# Patient Record
Sex: MALE | Race: WHITE | NOT HISPANIC OR LATINO | Employment: FULL TIME | URBAN - METROPOLITAN AREA
[De-identification: names, ages, dates, MRNs, and addresses within clinical notes are randomized per-mention and may not be internally consistent; named-entity substitution may affect disease eponyms.]

---

## 2017-02-17 ENCOUNTER — OFFICE VISIT - RIVER FALLS (OUTPATIENT)
Dept: FAMILY MEDICINE | Facility: CLINIC | Age: 62
End: 2017-02-17

## 2017-02-17 ASSESSMENT — MIFFLIN-ST. JEOR: SCORE: 2017.66

## 2017-06-07 ENCOUNTER — OFFICE VISIT - RIVER FALLS (OUTPATIENT)
Dept: FAMILY MEDICINE | Facility: CLINIC | Age: 62
End: 2017-06-07

## 2017-06-07 ASSESSMENT — MIFFLIN-ST. JEOR: SCORE: 2004.05

## 2017-06-15 ENCOUNTER — OFFICE VISIT - RIVER FALLS (OUTPATIENT)
Dept: FAMILY MEDICINE | Facility: CLINIC | Age: 62
End: 2017-06-15

## 2017-10-18 ENCOUNTER — OFFICE VISIT - RIVER FALLS (OUTPATIENT)
Dept: FAMILY MEDICINE | Facility: CLINIC | Age: 62
End: 2017-10-18

## 2017-10-18 ASSESSMENT — MIFFLIN-ST. JEOR: SCORE: 2033.99

## 2017-10-19 LAB
CREAT SERPL-MCNC: 0.94 MG/DL (ref 0.7–1.25)
GLUCOSE BLD-MCNC: 97 MG/DL (ref 65–99)

## 2018-10-08 ENCOUNTER — OFFICE VISIT - RIVER FALLS (OUTPATIENT)
Dept: FAMILY MEDICINE | Facility: CLINIC | Age: 63
End: 2018-10-08

## 2018-10-08 ENCOUNTER — AMBULATORY - RIVER FALLS (OUTPATIENT)
Dept: FAMILY MEDICINE | Facility: CLINIC | Age: 63
End: 2018-10-08

## 2018-10-08 ASSESSMENT — MIFFLIN-ST. JEOR: SCORE: 2054.85

## 2018-10-09 LAB
CREAT SERPL-MCNC: 0.96 MG/DL (ref 0.7–1.25)
GLUCOSE BLD-MCNC: 103 MG/DL (ref 65–99)

## 2019-08-05 ENCOUNTER — OFFICE VISIT - RIVER FALLS (OUTPATIENT)
Dept: FAMILY MEDICINE | Facility: CLINIC | Age: 64
End: 2019-08-05

## 2019-08-05 ASSESSMENT — MIFFLIN-ST. JEOR: SCORE: 2033.08

## 2019-09-10 ENCOUNTER — OFFICE VISIT - RIVER FALLS (OUTPATIENT)
Dept: FAMILY MEDICINE | Facility: CLINIC | Age: 64
End: 2019-09-10

## 2019-10-07 ENCOUNTER — OFFICE VISIT - RIVER FALLS (OUTPATIENT)
Dept: FAMILY MEDICINE | Facility: CLINIC | Age: 64
End: 2019-10-07

## 2019-10-07 LAB
ALBUMIN UR-MCNC: NEGATIVE G/DL
BILIRUB UR QL STRIP: NEGATIVE
GLUCOSE UR STRIP-MCNC: NEGATIVE MG/DL
HGB UR QL STRIP: NEGATIVE
KETONES UR STRIP-MCNC: NEGATIVE MG/DL
LEUKOCYTE ESTERASE UR QL STRIP: NEGATIVE
NITRATE UR QL: NEGATIVE
PH UR STRIP: 6 [PH] (ref 5–8)
SP GR UR STRIP: 1.01 (ref 1–1.03)

## 2019-10-07 ASSESSMENT — MIFFLIN-ST. JEOR: SCORE: 2026.73

## 2019-11-04 ENCOUNTER — COMMUNICATION - RIVER FALLS (OUTPATIENT)
Dept: FAMILY MEDICINE | Facility: CLINIC | Age: 64
End: 2019-11-04

## 2019-11-18 ENCOUNTER — OFFICE VISIT - RIVER FALLS (OUTPATIENT)
Dept: FAMILY MEDICINE | Facility: CLINIC | Age: 64
End: 2019-11-18

## 2019-11-18 ASSESSMENT — MIFFLIN-ST. JEOR: SCORE: 2013.12

## 2019-11-19 LAB
BUN SERPL-MCNC: 19 MG/DL (ref 7–25)
BUN/CREAT RATIO - HISTORICAL: ABNORMAL (ref 6–22)
CALCIUM SERPL-MCNC: 10 MG/DL (ref 8.6–10.3)
CHLORIDE BLD-SCNC: 102 MMOL/L (ref 98–110)
CHOLEST SERPL-MCNC: 205 MG/DL
CHOLEST/HDLC SERPL: 5.4 {RATIO}
CO2 SERPL-SCNC: 28 MMOL/L (ref 20–32)
CREAT SERPL-MCNC: 1.08 MG/DL (ref 0.7–1.25)
EGFRCR SERPLBLD CKD-EPI 2021: 73 ML/MIN/1.73M2
GLUCOSE BLD-MCNC: 101 MG/DL (ref 65–99)
HBA1C MFR BLD: 5.7 %
HDLC SERPL-MCNC: 38 MG/DL
LDLC SERPL CALC-MCNC: 138 MG/DL
NONHDLC SERPL-MCNC: 167 MG/DL
POTASSIUM BLD-SCNC: 4.5 MMOL/L (ref 3.5–5.3)
SODIUM SERPL-SCNC: 139 MMOL/L (ref 135–146)
TRIGL SERPL-MCNC: 156 MG/DL

## 2019-11-20 ENCOUNTER — COMMUNICATION - RIVER FALLS (OUTPATIENT)
Dept: FAMILY MEDICINE | Facility: CLINIC | Age: 64
End: 2019-11-20

## 2020-08-11 ENCOUNTER — OFFICE VISIT - RIVER FALLS (OUTPATIENT)
Dept: FAMILY MEDICINE | Facility: CLINIC | Age: 65
End: 2020-08-11

## 2020-08-12 ENCOUNTER — AMBULATORY - RIVER FALLS (OUTPATIENT)
Dept: FAMILY MEDICINE | Facility: CLINIC | Age: 65
End: 2020-08-12

## 2020-08-13 ENCOUNTER — COMMUNICATION - RIVER FALLS (OUTPATIENT)
Dept: FAMILY MEDICINE | Facility: CLINIC | Age: 65
End: 2020-08-13

## 2020-08-13 LAB
BASOPHILS # BLD MANUAL: 62 10*3/UL (ref 0–200)
BASOPHILS NFR BLD MANUAL: 0.8 %
BUN SERPL-MCNC: 19 MG/DL (ref 7–25)
BUN/CREAT RATIO - HISTORICAL: ABNORMAL (ref 6–22)
CALCIUM SERPL-MCNC: 9.4 MG/DL (ref 8.6–10.3)
CHLORIDE BLD-SCNC: 107 MMOL/L (ref 98–110)
CO2 SERPL-SCNC: 27 MMOL/L (ref 20–32)
CREAT SERPL-MCNC: 1.1 MG/DL (ref 0.7–1.25)
EGFRCR SERPLBLD CKD-EPI 2021: 71 ML/MIN/1.73M2
EOSINOPHIL # BLD MANUAL: 320 10*3/UL (ref 15–500)
EOSINOPHIL NFR BLD MANUAL: 4.1 %
ERYTHROCYTE [DISTWIDTH] IN BLOOD BY AUTOMATED COUNT: 15.6 % (ref 11–15)
GLUCOSE BLD-MCNC: 110 MG/DL (ref 65–99)
HCT VFR BLD AUTO: 39.5 % (ref 38.5–50)
HGB BLD-MCNC: 12.8 GM/DL (ref 13.2–17.1)
LYMPHOCYTES # BLD MANUAL: 2395 10*3/UL (ref 850–3900)
LYMPHOCYTES NFR BLD MANUAL: 30.7 %
MCH RBC QN AUTO: 25.8 PG (ref 27–33)
MCHC RBC AUTO-ENTMCNC: 32.4 GM/DL (ref 32–36)
MCV RBC AUTO: 79.6 FL (ref 80–100)
MONOCYTES # BLD MANUAL: 788 10*3/UL (ref 200–950)
MONOCYTES NFR BLD MANUAL: 10.1 %
NEUTROPHILS # BLD MANUAL: 4235 10*3/UL (ref 1500–7800)
NEUTROPHILS NFR BLD MANUAL: 54.3 %
PLATELET # BLD AUTO: 325 10*3/UL (ref 140–400)
PMV BLD: 9.2 FL (ref 7.5–12.5)
POTASSIUM BLD-SCNC: 4.7 MMOL/L (ref 3.5–5.3)
RBC # BLD AUTO: 4.96 10*6/UL (ref 4.2–5.8)
SODIUM SERPL-SCNC: 138 MMOL/L (ref 135–146)
WBC # BLD AUTO: 7.8 10*3/UL (ref 3.8–10.8)

## 2020-09-30 ENCOUNTER — AMBULATORY - RIVER FALLS (OUTPATIENT)
Dept: FAMILY MEDICINE | Facility: CLINIC | Age: 65
End: 2020-09-30

## 2020-09-30 ENCOUNTER — OFFICE VISIT - RIVER FALLS (OUTPATIENT)
Dept: FAMILY MEDICINE | Facility: CLINIC | Age: 65
End: 2020-09-30

## 2020-09-30 LAB
ALBUMIN UR-MCNC: NEGATIVE G/DL
BILIRUB UR QL STRIP: NEGATIVE
GLUCOSE UR STRIP-MCNC: NEGATIVE MG/DL
HGB UR QL STRIP: NEGATIVE
KETONES UR STRIP-MCNC: NEGATIVE MG/DL
LEUKOCYTE ESTERASE UR QL STRIP: NEGATIVE
NITRATE UR QL: NEGATIVE
PH UR STRIP: 7 [PH] (ref 5–8)
SP GR UR STRIP: 1.02 (ref 1–1.03)

## 2020-09-30 ASSESSMENT — MIFFLIN-ST. JEOR: SCORE: 2025.82

## 2020-10-01 LAB
CHOLEST SERPL-MCNC: 188 MG/DL
CHOLEST/HDLC SERPL: 4.9 {RATIO}
HDLC SERPL-MCNC: 38 MG/DL
LDLC SERPL CALC-MCNC: 128 MG/DL
NONHDLC SERPL-MCNC: 150 MG/DL
TRIGL SERPL-MCNC: 116 MG/DL

## 2020-10-02 ENCOUNTER — COMMUNICATION - RIVER FALLS (OUTPATIENT)
Dept: FAMILY MEDICINE | Facility: CLINIC | Age: 65
End: 2020-10-02

## 2020-12-01 ENCOUNTER — COMMUNICATION - RIVER FALLS (OUTPATIENT)
Dept: FAMILY MEDICINE | Facility: CLINIC | Age: 65
End: 2020-12-01

## 2020-12-14 ENCOUNTER — OFFICE VISIT - RIVER FALLS (OUTPATIENT)
Dept: FAMILY MEDICINE | Facility: CLINIC | Age: 65
End: 2020-12-14

## 2020-12-14 ASSESSMENT — MIFFLIN-ST. JEOR: SCORE: 2031.27

## 2020-12-15 ENCOUNTER — COMMUNICATION - RIVER FALLS (OUTPATIENT)
Dept: FAMILY MEDICINE | Facility: CLINIC | Age: 65
End: 2020-12-15

## 2020-12-15 LAB
ERYTHROCYTE [DISTWIDTH] IN BLOOD BY AUTOMATED COUNT: 14.6 % (ref 11–15)
HCT VFR BLD AUTO: 40.4 % (ref 38.5–50)
HGB BLD-MCNC: 13.6 GM/DL (ref 13.2–17.1)
MCH RBC QN AUTO: 28.1 PG (ref 27–33)
MCHC RBC AUTO-ENTMCNC: 33.7 GM/DL (ref 32–36)
MCV RBC AUTO: 83.5 FL (ref 80–100)
PLATELET # BLD AUTO: 304 10*3/UL (ref 140–400)
PMV BLD: 9.2 FL (ref 7.5–12.5)
RBC # BLD AUTO: 4.84 10*6/UL (ref 4.2–5.8)
WBC # BLD AUTO: 6.7 10*3/UL (ref 3.8–10.8)

## 2021-03-04 ENCOUNTER — AMBULATORY - HEALTHEAST (OUTPATIENT)
Dept: CARDIOLOGY | Facility: TELEHEALTH | Age: 66
End: 2021-03-04

## 2021-03-04 ENCOUNTER — OFFICE VISIT - RIVER FALLS (OUTPATIENT)
Dept: FAMILY MEDICINE | Facility: CLINIC | Age: 66
End: 2021-03-04

## 2021-03-04 DIAGNOSIS — R07.89 CHEST PAIN, ATYPICAL: ICD-10-CM

## 2021-03-04 ASSESSMENT — MIFFLIN-ST. JEOR: SCORE: 2058.48

## 2021-03-05 ENCOUNTER — RECORDS - HEALTHEAST (OUTPATIENT)
Dept: ADMINISTRATIVE | Facility: OTHER | Age: 66
End: 2021-03-05

## 2021-03-10 ENCOUNTER — HOSPITAL ENCOUNTER (OUTPATIENT)
Dept: CARDIOLOGY | Facility: CLINIC | Age: 66
Discharge: HOME | End: 2021-03-10

## 2021-03-10 DIAGNOSIS — R07.9 CHEST PAIN: ICD-10-CM

## 2021-03-10 LAB
CV STRESS CURRENT BP HE: NORMAL
CV STRESS CURRENT HR HE: 102
CV STRESS CURRENT HR HE: 103
CV STRESS CURRENT HR HE: 105
CV STRESS CURRENT HR HE: 107
CV STRESS CURRENT HR HE: 107
CV STRESS CURRENT HR HE: 110
CV STRESS CURRENT HR HE: 114
CV STRESS CURRENT HR HE: 117
CV STRESS CURRENT HR HE: 118
CV STRESS CURRENT HR HE: 119
CV STRESS CURRENT HR HE: 124
CV STRESS CURRENT HR HE: 128
CV STRESS CURRENT HR HE: 133
CV STRESS CURRENT HR HE: 134
CV STRESS CURRENT HR HE: 61
CV STRESS CURRENT HR HE: 66
CV STRESS CURRENT HR HE: 78
CV STRESS CURRENT HR HE: 80
CV STRESS CURRENT HR HE: 81
CV STRESS CURRENT HR HE: 82
CV STRESS CURRENT HR HE: 83
CV STRESS CURRENT HR HE: 84
CV STRESS CURRENT HR HE: 85
CV STRESS CURRENT HR HE: 85
CV STRESS CURRENT HR HE: 88
CV STRESS CURRENT HR HE: 97
CV STRESS DEVIATION TIME HE: NORMAL
CV STRESS ECHO PERCENT HR HE: NORMAL
CV STRESS EXERCISE STAGE HE: NORMAL
CV STRESS FINAL RESTING BP HE: NORMAL
CV STRESS FINAL RESTING HR HE: 82
CV STRESS MAX HR HE: 137
CV STRESS MAX TREADMILL GRADE HE: 16
CV STRESS MAX TREADMILL SPEED HE: 4.2
CV STRESS PEAK DIA BP HE: NORMAL
CV STRESS PEAK SYS BP HE: NORMAL
CV STRESS PHASE HE: NORMAL
CV STRESS PROTOCOL HE: NORMAL
CV STRESS RESTING PT POSITION HE: NORMAL
CV STRESS ST DEVIATION AMOUNT HE: NORMAL
CV STRESS ST DEVIATION ELEVATION HE: NORMAL
CV STRESS ST EVELATION AMOUNT HE: NORMAL
CV STRESS TEST TYPE HE: NORMAL
CV STRESS TOTAL STAGE TIME MIN 1 HE: NORMAL
ECHO EJECTION FRACTION ESTIMATED: 55 %
RATE PRESSURE PRODUCT: NORMAL
STRESS ECHO BASELINE DIASTOLIC HE: 86
STRESS ECHO BASELINE HR: 65
STRESS ECHO BASELINE SYSTOLIC BP: 154
STRESS ECHO CALCULATED PERCENT HR: 88 %
STRESS ECHO LAST STRESS DIASTOLIC BP: 78
STRESS ECHO LAST STRESS HR: 133
STRESS ECHO LAST STRESS SYSTOLIC BP: 186
STRESS ECHO POST ESTIMATED WORKLOAD: 10.3
STRESS ECHO POST EXERCISE DUR MIN: 9
STRESS ECHO POST EXERCISE DUR SEC: 0
STRESS ECHO TARGET HR: 156

## 2021-03-10 RX ORDER — MULTIVITAMIN WITH IRON
1 TABLET,CHEWABLE ORAL DAILY
Status: SHIPPED | COMMUNITY
Start: 2021-03-10 | End: 2022-04-14

## 2021-03-10 RX ORDER — SILDENAFIL 100 MG/1
100 TABLET, FILM COATED ORAL DAILY PRN
Status: SHIPPED | COMMUNITY
Start: 2021-03-10

## 2021-03-16 ENCOUNTER — AMBULATORY - HEALTHEAST (OUTPATIENT)
Dept: CARDIOLOGY | Facility: HOSPITAL | Age: 66
End: 2021-03-16

## 2021-03-16 ENCOUNTER — SURGERY - HEALTHEAST (OUTPATIENT)
Dept: CARDIOLOGY | Facility: CLINIC | Age: 66
End: 2021-03-16

## 2021-03-16 ENCOUNTER — AMBULATORY - HEALTHEAST (OUTPATIENT)
Dept: CARDIOLOGY | Facility: CLINIC | Age: 66
End: 2021-03-16

## 2021-03-16 ENCOUNTER — RECORDS - HEALTHEAST (OUTPATIENT)
Dept: ADMINISTRATIVE | Facility: OTHER | Age: 66
End: 2021-03-16

## 2021-03-16 ENCOUNTER — OFFICE VISIT - HEALTHEAST (OUTPATIENT)
Dept: CARDIOLOGY | Facility: CLINIC | Age: 66
End: 2021-03-16

## 2021-03-16 DIAGNOSIS — E78.5 DYSLIPIDEMIA: ICD-10-CM

## 2021-03-16 DIAGNOSIS — Z11.59 ENCOUNTER FOR SCREENING FOR OTHER VIRAL DISEASES: ICD-10-CM

## 2021-03-16 DIAGNOSIS — R07.2 PRECORDIAL PAIN: ICD-10-CM

## 2021-03-16 DIAGNOSIS — I10 ESSENTIAL HYPERTENSION: ICD-10-CM

## 2021-03-16 DIAGNOSIS — I25.10 CORONARY ARTERY DISEASE INVOLVING NATIVE CORONARY ARTERY OF NATIVE HEART WITHOUT ANGINA PECTORIS: ICD-10-CM

## 2021-03-16 RX ORDER — METOPROLOL SUCCINATE 25 MG/1
25 TABLET, EXTENDED RELEASE ORAL DAILY
Qty: 90 TABLET | Refills: 3 | Status: SHIPPED | OUTPATIENT
Start: 2021-03-16 | End: 2021-09-29 | Stop reason: ALTCHOICE

## 2021-03-16 RX ORDER — NITROGLYCERIN 0.4 MG/1
0.4 TABLET SUBLINGUAL EVERY 5 MIN PRN
Qty: 30 TABLET | Status: SHIPPED | OUTPATIENT
Start: 2021-03-16 | End: 2022-06-20

## 2021-03-16 ASSESSMENT — MIFFLIN-ST. JEOR: SCORE: 2043.64

## 2021-03-19 ENCOUNTER — AMBULATORY - HEALTHEAST (OUTPATIENT)
Dept: LAB | Facility: CLINIC | Age: 66
End: 2021-03-19

## 2021-03-19 DIAGNOSIS — Z11.59 ENCOUNTER FOR SCREENING FOR OTHER VIRAL DISEASES: ICD-10-CM

## 2021-03-20 LAB
SARS-COV-2 PCR COMMENT: NORMAL
SARS-COV-2 RNA SPEC QL NAA+PROBE: NEGATIVE
SARS-COV-2 VIRUS SPECIMEN SOURCE: NORMAL

## 2021-03-21 ENCOUNTER — COMMUNICATION - HEALTHEAST (OUTPATIENT)
Dept: SCHEDULING | Facility: CLINIC | Age: 66
End: 2021-03-21

## 2021-03-23 ENCOUNTER — AMBULATORY - HEALTHEAST (OUTPATIENT)
Dept: CARDIOLOGY | Facility: CLINIC | Age: 66
End: 2021-03-23

## 2021-03-23 ENCOUNTER — SURGERY - HEALTHEAST (OUTPATIENT)
Dept: CARDIOLOGY | Facility: HOSPITAL | Age: 66
End: 2021-03-23

## 2021-03-23 DIAGNOSIS — I20.0 UNSTABLE ANGINA (H): ICD-10-CM

## 2021-03-23 ASSESSMENT — MIFFLIN-ST. JEOR
SCORE: 2043.64
SCORE: 1998.28
SCORE: 2043.64

## 2021-03-24 ENCOUNTER — ANESTHESIA - HEALTHEAST (OUTPATIENT)
Dept: SURGERY | Facility: HOSPITAL | Age: 66
End: 2021-03-24

## 2021-03-24 ENCOUNTER — COMMUNICATION - HEALTHEAST (OUTPATIENT)
Dept: CARDIOLOGY | Facility: CLINIC | Age: 66
End: 2021-03-24

## 2021-03-24 ENCOUNTER — SURGERY - HEALTHEAST (OUTPATIENT)
Dept: ADMINISTRATIVE | Facility: CLINIC | Age: 66
End: 2021-03-24

## 2021-03-24 DIAGNOSIS — I25.10 CAD (CORONARY ARTERY DISEASE): ICD-10-CM

## 2021-03-24 DIAGNOSIS — I20.0 UNSTABLE ANGINA (H): ICD-10-CM

## 2021-03-24 ASSESSMENT — MIFFLIN-ST. JEOR
SCORE: 2035.02
SCORE: 2035.02

## 2021-03-25 ENCOUNTER — SURGERY - HEALTHEAST (OUTPATIENT)
Dept: SURGERY | Facility: HOSPITAL | Age: 66
End: 2021-03-25

## 2021-03-25 ASSESSMENT — MIFFLIN-ST. JEOR
SCORE: 2023.68
SCORE: 2023.68

## 2021-03-26 ASSESSMENT — MIFFLIN-ST. JEOR
SCORE: 2047.27
SCORE: 2047.27

## 2021-03-27 ASSESSMENT — MIFFLIN-ST. JEOR
SCORE: 2047.27
SCORE: 2047.27

## 2021-03-28 ASSESSMENT — MIFFLIN-ST. JEOR
SCORE: 2061.33
SCORE: 2061.33

## 2021-03-29 ASSESSMENT — MIFFLIN-ST. JEOR
SCORE: 2022.78
SCORE: 2022.78

## 2021-04-02 ENCOUNTER — COMMUNICATION - HEALTHEAST (OUTPATIENT)
Dept: CARDIOLOGY | Facility: CLINIC | Age: 66
End: 2021-04-02

## 2021-04-05 ENCOUNTER — OFFICE VISIT - RIVER FALLS (OUTPATIENT)
Dept: FAMILY MEDICINE | Facility: CLINIC | Age: 66
End: 2021-04-05

## 2021-04-05 ASSESSMENT — MIFFLIN-ST. JEOR: SCORE: 2017.66

## 2021-04-27 ENCOUNTER — OFFICE VISIT - HEALTHEAST (OUTPATIENT)
Dept: CARDIOLOGY | Facility: CLINIC | Age: 66
End: 2021-04-27

## 2021-04-27 DIAGNOSIS — R01.0 BENIGN HEART MURMUR: ICD-10-CM

## 2021-04-27 DIAGNOSIS — Z95.1 S/P CORONARY ARTERY BYPASS GRAFT X 2: ICD-10-CM

## 2021-05-17 ENCOUNTER — OFFICE VISIT - HEALTHEAST (OUTPATIENT)
Dept: CARDIOLOGY | Facility: CLINIC | Age: 66
End: 2021-05-17

## 2021-05-17 DIAGNOSIS — I25.10 CORONARY ARTERY DISEASE INVOLVING NATIVE CORONARY ARTERY OF NATIVE HEART WITHOUT ANGINA PECTORIS: ICD-10-CM

## 2021-05-17 DIAGNOSIS — E78.5 DYSLIPIDEMIA: ICD-10-CM

## 2021-05-17 DIAGNOSIS — R55 PRE-SYNCOPE: ICD-10-CM

## 2021-05-17 DIAGNOSIS — R42 DIZZINESS: ICD-10-CM

## 2021-05-17 ASSESSMENT — MIFFLIN-ST. JEOR: SCORE: 2025.5

## 2021-05-20 ENCOUNTER — HOSPITAL ENCOUNTER (OUTPATIENT)
Dept: CARDIOLOGY | Facility: CLINIC | Age: 66
Discharge: HOME | End: 2021-05-20
Attending: INTERNAL MEDICINE

## 2021-05-20 DIAGNOSIS — R55 PRE-SYNCOPE: ICD-10-CM

## 2021-05-20 DIAGNOSIS — R42 DIZZINESS: ICD-10-CM

## 2021-05-27 VITALS
HEART RATE: 64 BPM | DIASTOLIC BLOOD PRESSURE: 78 MMHG | SYSTOLIC BLOOD PRESSURE: 124 MMHG | BODY MASS INDEX: 38.8 KG/M2 | RESPIRATION RATE: 16 BRPM | WEIGHT: 271 LBS | HEIGHT: 70 IN

## 2021-06-05 VITALS
BODY MASS INDEX: 38.71 KG/M2 | WEIGHT: 270.4 LBS | HEIGHT: 70 IN | WEIGHT: 270.4 LBS | BODY MASS INDEX: 38.71 KG/M2 | HEIGHT: 70 IN

## 2021-06-05 VITALS
HEIGHT: 70 IN | DIASTOLIC BLOOD PRESSURE: 86 MMHG | HEART RATE: 70 BPM | WEIGHT: 275 LBS | OXYGEN SATURATION: 96 % | SYSTOLIC BLOOD PRESSURE: 126 MMHG | BODY MASS INDEX: 39.37 KG/M2 | RESPIRATION RATE: 18 BRPM

## 2021-06-08 ENCOUNTER — COMMUNICATION - HEALTHEAST (OUTPATIENT)
Dept: CARDIOLOGY | Facility: CLINIC | Age: 66
End: 2021-06-08

## 2021-06-08 DIAGNOSIS — E78.5 DYSLIPIDEMIA: ICD-10-CM

## 2021-06-08 RX ORDER — ROSUVASTATIN CALCIUM 5 MG/1
5 TABLET, COATED ORAL AT BEDTIME
Qty: 30 TABLET | Refills: 1 | Status: SHIPPED | OUTPATIENT
Start: 2021-06-08 | End: 2021-08-05

## 2021-06-16 PROBLEM — I20.0 UNSTABLE ANGINA (H): Status: ACTIVE | Noted: 2021-03-23

## 2021-06-16 PROBLEM — I25.10 CORONARY ARTERY DISEASE INVOLVING NATIVE CORONARY ARTERY OF NATIVE HEART WITHOUT ANGINA PECTORIS: Status: ACTIVE | Noted: 2021-03-16

## 2021-06-16 PROBLEM — I25.110 CORONARY ARTERY DISEASE INVOLVING NATIVE CORONARY ARTERY OF NATIVE HEART WITH UNSTABLE ANGINA PECTORIS (H): Status: ACTIVE | Noted: 2021-03-16

## 2021-06-16 PROBLEM — R07.2 PRECORDIAL PAIN: Status: ACTIVE | Noted: 2021-03-16

## 2021-06-16 PROBLEM — E78.5 DYSLIPIDEMIA: Status: ACTIVE | Noted: 2021-03-16

## 2021-06-16 NOTE — ANESTHESIA PROCEDURE NOTES
Central line    Start time: 3/25/2021 2:09 PM  End time: 3/25/2021 2:26 PM  Patient location: OR Post-induction  Indications: central pressure monitoring and vascular access  Performing Anesthesiologist: dAam Falcon MD  Pre-procedure Checklist  Completed: patient identified, site marked, risks, benefits, and alternatives discussed, timeout performed, consent obtained, hand hygiene performed, all elements of maximal sterile barriers used including cap, mask, gown, sterile gloves, and large sheet and skin prep agent completely dried prior to procedure    Procedure Details:  Preparation: 2% chlorhexidine  Location details: right internal jugular  Site selection rationale: CV surgery  Catheter type: Introducer with Langlois-Ashley  Introducer type: MAC  Lumens:double lumenWedged at: 54   Withdrawn and locked at: 49  Ultrasound evaluation of access site: yes   Sterile gel and probe cover used in ultrasound-guided central venous catheter insertion  Vessel patent by US exam  Concurrent real time visualization of needle entry  Visualized anatomic structures normal  Ultrasound permanent image saved  No Pathological Findings  Transduced for venous waveform  Manometry confirmation of venous access    Post-procedure:   line sutured and Antimicrobial disks with CHG applied  Assessment: blood return through all ports and free fluid flow  Complications: none

## 2021-06-16 NOTE — ANESTHESIA POSTPROCEDURE EVALUATION
Patient: Gene Carrero  Procedure(s):  CORONARY ARTERY BYPASS GRAFT X2, WITH LEFT LEG ENDOSCOPIC VESSEL PROCUREMENT, LEFT INTERNAL MAMMARY ARTERY HARVEST, ANESTHESIA TRANSESOPHAGEAL ECHOCARDIOGRAM, EPIAORTIC ULTRASOUND  Anesthesia type: general    Patient location: Telemetry/Step Down Unit  Last vitals:   Vitals Value Taken Time   /66 03/27/21 1124   Temp 36.8  C (98.2  F) 03/27/21 1124   Pulse 68 03/27/21 1358   Resp 18 03/27/21 1124   SpO2 91 % 03/27/21 1358   Vitals shown include unvalidated device data.  Post vital signs: stable  Level of consciousness: awake, alert and oriented  Post-anesthesia pain: pain controlled  Post-anesthesia nausea and vomiting: no  Pulmonary: CPAP  Cardiovascular: stable and blood pressure at baseline  Hydration: adequate  Anesthetic events: no    QCDR Measures:  ASA# 11 - Trista-op Cardiac Arrest: ASA11B - Patient did NOT experience unanticipated cardiac arrest  ASA# 12 - Trista-op Mortality Rate: ASA12B - Patient did NOT die  ASA# 13 - PACU Re-Intubation Rate: ASA13B - Patient did NOT require a new airway mgmt  ASA# 10 - Composite Anes Safety: ASA10A - No serious adverse event    Additional Notes:

## 2021-06-16 NOTE — TELEPHONE ENCOUNTER
4/2  Left VM for patient to call CV RN with update after discharging from hospital.    Contact info provided.    4/5  Received VM from pt returning call, returned call and left another  for pt to call CV RN.    CARDIOTHORACIC SURGERY  Discharge Follow Up Phone Call    POST OP MONITORING  How is your pain on a 0-10 scale, how are you managing your pain? Patient isn't having any pain or taking any tylenol.    ACTIVITY  How is your activity tolerance? Remaining active and tolerating it well.  Are you still doing sternal precautions? Yes.  Do you hear any clicking when you are moving or taking a deep breath? No.    Are you weighing yourself daily? Weight has remained stable.    SIGNS AND SYMPTOMS OF INFECTION  1. INCREASE IN PAIN - No  2. FEVER - No  3. DRAINAGE - No If so, color: NA  4. REDNESS - No  5. SWELLING - No    ASSISTANCE  Do you have someone at home to assist you with your daily activities? Spouse is helping pt.    MEDICATIONS  Is someone helping you to set up your medications? Pt is independent.  Do you have any questions about your medications? No.    Are you on a blood thinner? No.  Who is managing your INRs? NA    FOLLOW UP  Are you scheduled for cardiac rehab? 4/2.    You are scheduled to see our surgery advanced practive provider for post operative follow up on 4/27.  You are scheduled to see your cardiologist on 5/17.  You are scheduled to see your primary care physician on 4/5.    CONTACT INFORMATION  Please feel free to call us with any questions or symptoms that are concerning for you at [phone]. If it is after 4:30 in the afternoon, or a weekend please call [phone] and ask for the on call specialist. We want to do everything we can to help prevent you needing to return to the ED, so please do not hesitate to call us.    Cheri Alvarez, RNCC  Cardiothoracic Surgery  [phone]

## 2021-06-16 NOTE — ANESTHESIA PROCEDURE NOTES
BENTON    Patient location during procedure: OR  Start time: 3/25/2021 2:31 PM  Staffing:  Performing  Anesthesiologist: Adam Falcon MD  BENTON:  Type/Reason: Monitoring BENTON  Technique: blind insertion  Difficulty: easy  Anesthesia Monitoring: see additional note

## 2021-06-16 NOTE — ANESTHESIA PREPROCEDURE EVALUATION
Anesthesia Evaluation      Patient summary reviewed   No history of anesthetic complications     Airway   Mallampati: III  Neck ROM: full   Pulmonary - normal exam   (+) sleep apnea,   (-) pneumonia, asthma, shortness of breath, recent URI                         Cardiovascular   Exercise tolerance: > or = 4 METS  (+) hypertension, CAD, angina, , hypercholesterolemia,     (-) CABG/stent  ECG reviewed  Rhythm: regular        Neuro/Psych - negative ROS   (-) no CVA    Endo/Other    (+) arthritis, obesity,   (-) no diabetes     GI/Hepatic/Renal    (+) GERD,     (-) esophageal disease     Other findings: EF 58%, no WMA, no significant valve dz, mild ascending aortic dilation at 4.1cm      Dental - normal exam                        Anesthesia Plan  Planned anesthetic: general endotracheal  glidescope intubation    Magnesium & precedex gtt for pain, ketamine 50mg, methadone 20mg    ASA 3   Induction: intravenous   Anesthetic plan and risks discussed with: patient and spouse  Anesthesia plan special considerations: increased risk of difficult airway, antiemetics, CVP line, arterial catheterization, pulmonary artery catheterization,   Post-op plan: extended intubation/vent support and other

## 2021-06-16 NOTE — PROGRESS NOTES
Gene JUAN Carrero   [address] [facility] [address]  [phone] (home)     Primary cardiologist:  Dr. Gibbs  PCP:  Jeff Haider MD  Procedure:  Coronary Angiogram With Possible Percutaneous Coronary Intervention  H&P completed by:  Dr. Gibbs  Case MD:  Dr. Brian or Dr. Shanks  Admit date and time:  3/23/21 @ 07:30  Case start time:  TBD  Ordering MD:  Dr. Gibbs  Diagnosis:  Abnormal Stress Echo  Anticoagulation: None  CPAP: Yes -instructed to bring  Bypass Grafts: No  Renal Issues: No  Allergies: NKA  Diabetic?: No  Device?: No     Angiogram Teaching    Reason for Visit:  Patient seen for pre-procedure education in preparation for: Coronary Angiogram With Possible Percutaneous Coronary Intervention    Procedure Prep:  Cardiologist note dated: 3/16/21  EKG results obtained, dated: on admission  Pertinent test results obtained - Viewable in Epic, dated: 3/10/21 Stress Echo  Hemogram results obtained: on admmission  Basic Metabolic Panel results obtained: on admission  Lipid Profile results obtained: on admission    Pre-procedure instructions  Patient instructed to be NPO after midnight.  Patient instructed to arrange for transportation home following procedure.  Patient instructed to have a responsible adult with them for 24 hours post-procedure.  Post-procedure follow up process.  Conscious sedation discussed.  The patient was sent the pre-procedure letter (If requested) on 3/16/21    Pre-procedure medication instructions  Patient instructed on antiplatelet medication.  Continue medications as scheduled, with a small amount of water on the day of the procedure unless indicated.  Patient instructed to take 325 mg of Aspirin am of procedure: Yes  Other medication: instructed to hold multivitamins, minerals, viagra for 5 days prior a.m. of the procedure.    *PATIENTS RECORDS AVAILABLE IN [facility] UNLESS OTHERWISE INDICATED*    *Order set was entered on this date: 3/16/21      Patient Active  Problem List   Diagnosis     Dyslipidemia     Coronary artery disease involving native coronary artery of native heart without angina pectoris     Precordial pain       Current Outpatient Medications   Medication Sig Dispense Refill     aspirin 81 MG EC tablet Take 81 mg by mouth daily.       atorvastatin (LIPITOR) 80 MG tablet Take 1 tablet (80 mg total) by mouth at bedtime. 90 tablet 3     lisinopriL (PRINIVIL,ZESTRIL) 10 MG tablet Take 10 mg by mouth daily.       metoprolol succinate (TOPROL-XL) 25 MG Take 1 tablet (25 mg total) by mouth daily. 90 tablet 3     nitroglycerin (NITROSTAT) 0.4 MG SL tablet Place 1 tablet (0.4 mg total) under the tongue every 5 (five) minutes as needed for chest pain. 30 tablet prn     omeprazole (PRILOSEC) 20 MG capsule Take 20 mg by mouth daily before breakfast.       pediatric multivitamin-iron (POLY-VI-SOL WITH IRON) chewable tablet Chew 1 tablet daily.       sildenafiL (VIAGRA) 100 MG tablet Take 100 mg by mouth daily as needed for erectile dysfunction.       No current facility-administered medications for this visit.        No Known Allergies    Plan  Patient's wife, Layla, will be the  the day of the procedure and will stay with patient for 24 hours after.  Patient ready for procedure    LISA Knight

## 2021-06-16 NOTE — ANESTHESIA CARE TRANSFER NOTE
Last vitals:   Vitals:    03/25/21 1842   BP: 124/78   Pulse: 80   Resp: 16   Temp: 36.1  C (97  F)   SpO2: 99%     Patient's level of consciousness is unresponsive and sedated and intubated  Spontaneous respirations: no: sedated and intubated  Maintains airway independently: no: intubated  Dentition unchanged: yes  Oropharynx: endotracheal tube in place    QCDR Measures:  ASA# 20 - Surgical Safety Checklist: WHO surgical safety checklist completed prior to induction    PQRS# 430 - Adult PONV Prevention: 4558F - Pt received => 2 anti-emetic agents (different classes) preop & intraop  ASA# 8 - Peds PONV Prevention: NA - Not pediatric patient, not GA or 2 or more risk factors NOT present  PQRS# 424 - Trista-op Temp Management: 4559F - At least one body temp DOCUMENTED => 35.5C or 95.9F within required timeframe  PQRS# 426 - PACU Transfer Protocol: - Transfer of care checklist used  ASA# 14 - Acute Post-op Pain: ASA14B - Patient did NOT experience pain >= 7 out of 10

## 2021-06-16 NOTE — TELEPHONE ENCOUNTER
Spoke to wife about pt's surgery tomorrow and addressed all questions/concerns. Explained average duration of stay in the hospital, what surgery is being performed, length of surgery and post op events/goals for patient.    Explained he will need cardiac rehab after surgery and may be able to be discharged directly home.    Contact info provided.

## 2021-06-16 NOTE — ANESTHESIA PROCEDURE NOTES
Arterial Line  Reason for Procedure: hemodynamic monitoring and multiple ABGs  Patient location during procedure: OR pre-induction  Start time: 3/25/2021 1:49 PM  End time: 3/25/2021 1:54 PM  Staffing:  Performing  Anesthesiologist: Adam Falcon MD  CRNA: Jess Werner CRNA  Sterile Precautions:  sterile barriers used during insertion: cap, mask, sterile gloves, large sheet, and hand hygiene used.  Arterial Line:     Laterality: left  Location: brachial  Prepped with: ChloroPrep    Needle gauge: 20 G  Number of Attempts: 1  Secured with: tape, transparent dressing and pressure dressing (suture)  Flushed with: saline  1% lidocaine local anesthesia used for skin prep.   See MAR for additional medications given.  Ultrasound evaluation of access site: yes  Vessel patent by US exam    Concurrent real time visualization of needle entry    Permanent ultrasound image captured

## 2021-06-17 NOTE — PATIENT INSTRUCTIONS - HE
S/P coronary artery bypass graft x 2 with LIMA-LAD, rSVG-PDA, EVH from [facility] on 03/25/2021 by Dr Cristian Juarez  Postop hospital stay was uneventful and he was discharged to home on 03/29/2021  Virtual telephone visit today due to COVID-19 social distancing guidelines  Patient did not sound short of breath during this conversation  He denies incisional chest pain and or shortness of breath but still has occasional left side, resolved with rest and Tylenol  He stated that his surgical incisions are healing well and he has not appreciated any sternal clicking noises  He stated that his appetite is not as good yet, bowel movement is regular and he voids without difficulties  He will start outpatient cardiac rehab 04/01/2021   Patient is scheduled to see his PCP Jeff Roberson MD on 04/07/2021  Scheduled to see his primary cardiologist Dr Theresa Gibbs MD on 05/17/2021  May start to drive when ever he feels comfortable, 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

## 2021-06-17 NOTE — PROGRESS NOTES
Assessment:       1. S/P coronary artery bypass graft x 2 with LIMA-LAD, rSVG-PDA, EVH from [facility] on o3/25/2021          Plan:   S/P coronary artery bypass graft x 2 with LIMA-LAD, rSVG-PDA, EVH from [facility] on 03/25/2021 by Dr Cristian Juarez  Postop hospital stay was uneventful and he was discharged to home on 03/29/2021  Virtual telephone visit today due to COVID-19 social distancing guidelines  Patient did not sound short of breath during this conversation  He denies incisional chest pain and or shortness of breath but still has occasional left side, resolved with rest and Tylenol  He stated that his surgical incisions are healing well and he has not appreciated any sternal clicking noises  He stated that his appetite is not as good yet, bowel movement is regular and he voids without difficulties  He will start outpatient cardiac rehab 04/01/2021   Patient is scheduled to see his PCP Jeff Roberson MD on 04/07/2021  Scheduled to see his primary cardiologist Dr Theresa Gibbs MD on 05/17/2021  May start to drive when ever he feels comfortable, 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     aspirin 81 mg chewable tablet Chew 2 tablets (162 mg total) daily.     atorvastatin (LIPITOR) 80 MG tablet Take 1 tablet (80 mg total) by mouth at bedtime.     metoprolol succinate (TOPROL-XL) 25 MG Take 1 tablet (25 mg total) by mouth daily.     nitroglycerin (NITROSTAT) 0.4 MG SL tablet Place 1 tablet (0.4 mg total) under the tongue every 5 (five) minutes as needed for chest pain.     omeprazole (PRILOSEC) 20 MG capsule Take 20 mg by  mouth daily before breakfast.     pediatric multivitamin-iron (POLY-VI-SOL WITH IRON) chewable tablet Chew 1 tablet daily.     bisacodyL (DULCOLAX) 5 mg EC tablet Take 2 tablets (10 mg total) by mouth daily as needed for constipation.     sildenafiL (VIAGRA) 100 MG tablet Take 100 mg by mouth daily as needed for erectile dysfunction.           Subjective:        Patient ID: Gene Carrero is a 65 y.o. male.    Chief Complaint:  HPI  The following portions of the patient's history were reviewed and updated as appropriate:   Mr Carrero is a 65 years old male who underwent coronary artery bypass graft x 2 with LIMA-LAD, rSVG-PDA, EVH from [facility] on 03/25/2021 by Dr Cristian Juarez at [facility].  Postop hospital stay was uneventful and he was discharged to home on 03/29/2021.   CV surgery Virtual telephone visit today due to COVID-19 social distancing guidelines.  Patient did not sound short of breath during this conversation. He denies incisional chest pain and or shortness of breath but still has occasional left side in the left internal mammary artery harvest distribution, managed with rest and Tylenol.  He stated that his surgical incisions sites are healing well and he has not appreciated any sternal clicking noises.  He stated that his appetite is not as good yet and he was advised to eat multiple small meals in the meantime, bowel movement is regular and he voids without difficulties.  He will start outpatient cardiac rehab 04/01/2021 and is tolerating well.  Patient saw his PCP Jeff Roberson MD on 04/07/2021 and is scheduled to see his primary cardiologist Dr Theresa Gibbs MD on 05/17/2021.  He stated that he had one episode of feeling dizzy for about an hour and a half last Saturday, he did check his blood pressure and it was normal. He has not had another episode. Patient was advised to monitor for repeat symptoms and report it it reoccurs.  May start to drive when ever he feels  comfortable. 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  He was advised to continue the 10 pounds weight restriction for 6-8 weeks. He is otherwise doing well and will not need any further CV surgery follow-up but advised to call with questions or concerns.     Review of Systems   Constitution: Negative.   HENT: Negative.    Eyes: Negative.    Cardiovascular: Negative.    Respiratory: Negative.    Hematologic/Lymphatic: Negative.    Skin: Negative.    Musculoskeletal: Negative.    Gastrointestinal: Negative.    Genitourinary: Negative.    Neurological: Negative.    Psychiatric/Behavioral: Negative.           Objective:     Physical Exam  No physical examination was done for this visit due to the virtual telephone nature of it.

## 2021-06-25 ENCOUNTER — COMMUNICATION - RIVER FALLS (OUTPATIENT)
Dept: FAMILY MEDICINE | Facility: CLINIC | Age: 66
End: 2021-06-25
Payer: MEDICARE

## 2021-06-30 NOTE — PROGRESS NOTES
"Progress Notes by Theresa Gibbs MD at 5/17/2021 10:30 AM     Author: Theresa Gibbs MD Service: -- Author Type: Physician    Filed: 5/17/2021 10:32 AM Encounter Date: 5/17/2021 Status: Signed    : Theresa Gibbs MD (Physician)                                       Thank you Dr. Haider for asking the [facility] Heart Care team to participate in the care of your patient, Gene Carrero.     Impression and Plan     1.  Coronary artery disease.  Gene has known coronary artery disease. Specifically, Geen underwent two-vessel coronary artery bypass graft surgery 25 March 2021 with ASHOK graft to the LAD and saphenous vein graft to the right PDA    On interview, Gene is doing well.  No concerning anginal type symptoms.  He has been participating in cardiac rehab without incident.    2.  Dyslipidemia.  Lipid profile 23 March 2021 revealed LDL 71 mg/dL and HDL 27 mg/dL.    Continue atorvastatin.     3.  \"Dizziness\".  As noted below, Gene does report some \"dizziness\", but not necessarily sensation of fainting or lightheadedness.  He has in the past had some issues with vertigo and some of his symptoms are suggestive of this.  Nonetheless, feel be reasonable to fully exclude any rhythm issue which could be a contributor.  Plan:    2-week one-patch monitor.    Will tentatively plan on follow-up in approximately 6 months.    25 minutes spent reviewing prior records (including documentation, laboratory studies, cardiac testing/imaging), interview with patient along with physical exam, planning, and subsequent documentation/crafting of note.           History of Present Illness    Once again I would like to thank you again for asking me to participate in the care of your patient, Gene Carrero.  As you know, but to reiterate for my own records, Gene Carrero is a 65 y.o. male with known coronary artery disease.  Specifically, Gene underwent two-vessel coronary artery " "bypass graft surgery 25 March 2021 with ASHOK graft to the LAD and saphenous vein graft to the right PDA.    On interview today, Gene denies any anginal type symptoms.  Breathing is comfortable.  He does report some intermittent \"dizziness\" this seems to be more disequilibrium as opposed to sensation of actual fainting or lightheadedness.  He has been participating in cardiac rehab without incident.  No fevers, chills, or other constitutional symptoms.    Further review of systems is otherwise negative/noncontributory (medical record and 13 point review of systems reviewed as well and pertinent positives noted).         Cardiac Diagnostics        Echocardiogram 23 March 2021:  1. Normal left ventricular size and systolic performance with ejection fraction of 55 to 60%.  2. No significant valvular heart disease.  3. Normal right ventricular size and systolic performance.  4. Normal atrial dimensions.  5. Mild aortic root enlargement.    Coronary angiogram 23 March 2021 (precoronary artery bypass graft surgery):  1. Left main coronary artery: No significant stenosis.  2. Left anterior descending coronary artery: 50% mid vessel stenosis.  3. Circumflex coronary artery: Small vessel with proximal-mid 50% stenosis.  4. Right coronary artery: Mid 100% stenosis with subsequent 80% stenosis.  Right PDA with 100% stenosis.  Distal PDA fills faintly from collaterals from bridging collaterals.  5. Left ventriculography: Normal left ventricular systolic performance without wall motion abnormality.      Stress echocardiogram 5 March 2021 (precoronary angiogram):  1. Abnormal stress echocardiogram with evidence of post exercise hypokinesis involving the mid anterolateral, apical lateral and apex. All other segments are normal.  2. Normal baseline left ventricular systolic performance with ejection fraction of 55%.  3. The stress electrocardiogram was abnormal with 1 mm of horizontal ST segment depression in leads II, III, aVF, " "and V3-6, leads, and returning to baseline after 10 minutes.  4. No chest pain symptoms reported during test.  5. Normal exercise capacity for age/gender.           Physical Examination       /78 (Patient Site: Left Arm, Patient Position: Sitting, Cuff Size: Adult Large)   Pulse 64   Resp 16   Ht 5' 10\" (1.778 m)   Wt (!) 271 lb (122.9 kg)   BMI 38.88 kg/m          Wt Readings from Last 3 Encounters:   05/17/21 (!) 271 lb (122.9 kg)   03/29/21 (!) 270 lb 6.4 oz (122.7 kg)   03/16/21 (!) 275 lb (124.7 kg)     The patient is alert and oriented times three. Sclerae are anicteric. Mucosal membranes are moist. Jugular venous pressure is normal. No significant adenopathy/thyromegally appreciated. Lungs are clear with good expansion. On cardiovascular exam, the patient has a regular S1 and S2. Abdomen is soft and non-tender. Extremities reveal no clubbing, cyanosis, or edema.       Family History/Social History/Risk Factors   Patient does not smoke.  Family history of hypertension in father.         Medications  Allergies   Current Outpatient Medications   Medication Sig Dispense Refill   ? aspirin 81 mg chewable tablet Chew 2 tablets (162 mg total) daily.  0   ? atorvastatin (LIPITOR) 80 MG tablet Take 1 tablet (80 mg total) by mouth at bedtime. 90 tablet 3   ? metoprolol succinate (TOPROL-XL) 25 MG Take 1 tablet (25 mg total) by mouth daily. 90 tablet 3   ? omeprazole (PRILOSEC) 20 MG capsule Take 20 mg by mouth daily before breakfast.     ? pediatric multivitamin-iron (POLY-VI-SOL WITH IRON) chewable tablet Chew 1 tablet daily.     ? acetaminophen (TYLENOL) 325 MG tablet Take 2 tablets (650 mg total) by mouth every 4 (four) hours as needed.  0   ? bisacodyL (DULCOLAX) 5 mg EC tablet Take 2 tablets (10 mg total) by mouth daily as needed for constipation.  0   ? nitroglycerin (NITROSTAT) 0.4 MG SL tablet Place 1 tablet (0.4 mg total) under the tongue every 5 (five) minutes as needed for chest pain. 30 tablet " prn   ? sildenafiL (VIAGRA) 100 MG tablet Take 100 mg by mouth daily as needed for erectile dysfunction.       No current facility-administered medications for this visit.       No Known Allergies       Lab Results   Lab Results   Component Value Date     03/29/2021    K 3.4 (L) 03/29/2021     03/29/2021    CO2 27 03/29/2021    BUN 18 03/29/2021    CREATININE 0.94 03/29/2021    CALCIUM 8.3 (L) 03/29/2021     Lab Results   Component Value Date    WBC 10.7 03/29/2021    HGB 10.5 (L) 03/29/2021    HCT 32.4 (L) 03/29/2021    MCV 84 03/29/2021     03/29/2021     Lab Results   Component Value Date    CHOL 118 03/23/2021    TRIG 99 03/23/2021    HDL 27 (L) 03/23/2021    LDLCALC 71 03/23/2021     Lab Results   Component Value Date    INR 1.27 (H) 03/26/2021

## 2021-06-30 NOTE — PROGRESS NOTES
Progress Notes by Theresa Gibbs MD at 3/16/2021  3:30 PM     Author: Theresa Gibbs MD Service: -- Author Type: Physician    Filed: 3/16/2021  4:10 PM Encounter Date: 3/16/2021 Status: Signed    : Theresa Gibbs MD (Physician)                   Thank you Dr. Haider for asking the [facility] Heart Care team to participate in the care of your patient, Gene Carrero.     Impression and Plan     1.  Suspected coronary disease/chest discomfort.  Patient with risk factors for development of coronary disease.  Gene did undergo a stress echocardiogram 5 March 2021 that  suggested ischemia in the LAD distribution (see Cardiac Diagnostic section below).  He does have intermittent chest discomfort though some features are somewhat atypical.  Specifically, at times he can do a fair amount of activity without provocation of any chest discomfort symptoms.  Nonetheless, symptoms may in part be ischemically mediated.  I discussed options for additional work-up and recommended direct angiography   PCI depending upon the findings.  After discussing the risks and indications, patient states that he is willing to proceed.  Plan:    Continue daily aspirin.    Will in addition initiate metoprolol succinate 25 mg daily.    Provided patient prescription for sublingual nitroglycerin and instructions on its use (did advise patient not to take sublingual nitroglycerin if he has used sildenafil).    Coronary angiography   PCI depending upon the findings    2.  Hypertension.  Blood pressure is fairly reasonable in the office today.    Continue lisinopril.    Initiate metoprolol succinate as per problem #1.    3.  Dyslipidemia.  Lipid profile 30 September 2020 revealed  mg/dL and HDL 38 mg/dL.    Plan to initiate high intensity statin therapy, atorvastatin 80 mg daily.    35 minutes spent reviewing prior records (including documentation, laboratory studies, cardiac testing/imaging), interview  "with patient along with physical exam, planning, and subsequent documentation/crafting of note.       History of Present Illness    Once again I would like to thank you again for asking me to participate in the care of your patient, Gene Carrero.  As you know, but to reiterate for my own records, Gene Carrero is a 65 y.o. male with who has been experiencing symptoms of chest discomfort.  Patient describes a mild chest pressure type discomfort in the left side of his chest.  This is been intermittent through the summer.  It seems to be rather sporadic, over.  At times he can do a fair amount of activity without necessarily provoking the discomfort.  When it occurs that usually last 5 minutes or less.  He minimizes associated shortness of breath.  He denies any palpitations or lightheadedness.  No fevers, chills, or other constitutional symptoms.    Further review of systems is otherwise negative/noncontributory (medical record and 13 point review of systems reviewed as well and pertinent positives noted).         Cardiac Diagnostics     Stress echocardiogram 5 March 2021:  1. Abnormal stress echocardiogram with evidence of post exercise hypokinesis involving the mid anterolateral, apical lateral and apex. All other segments are normal.  2. Normal baseline left ventricular systolic performance with ejection fraction of 55%.  3. The stress electrocardiogram was abnormal with 1 mm of horizontal ST segment depression in leads II, III, aVF, and V3-6, leads, and returning to baseline after 10 minutes.  4. No chest pain symptoms reported during test.  5. Normal exercise capacity for age/gender.         Physical Examination       /86 (Patient Site: Right Arm, Patient Position: Sitting, Cuff Size: Adult Large)   Pulse 70   Resp 18   Ht 5' 10\" (1.778 m)   Wt (!) 275 lb (124.7 kg)   SpO2 96%   BMI 39.46 kg/m          Wt Readings from Last 3 Encounters:   03/16/21 (!) 275 lb (124.7 kg)     The patient is " alert and oriented times three. Sclerae are anicteric. Mucosal membranes are moist. Jugular venous pressure is normal. No significant adenopathy/thyromegally appreciated. Lungs are clear with good expansion. On cardiovascular exam, the patient has a regular S1 and S2. Abdomen is soft and non-tender. Extremities reveal no clubbing, cyanosis, or edema.         Family History/Social History/Risk Factors   Patient does not smoke.  Mother had a history of early onset coronary disease.  Maternal uncle also with history of bypass surgery at age 47.  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  He enjoys camping and bowling in his spare time.       Medications  Allergies   Current Outpatient Medications   Medication Sig Dispense Refill   ? aspirin 81 MG EC tablet Take 81 mg by mouth daily.     ? lisinopriL (PRINIVIL,ZESTRIL) 10 MG tablet Take 10 mg by mouth daily.     ? omeprazole (PRILOSEC) 20 MG capsule Take 20 mg by mouth daily before breakfast.     ? pediatric multivitamin-iron (POLY-VI-SOL WITH IRON) chewable tablet Chew 1 tablet daily.     ? atorvastatin (LIPITOR) 80 MG tablet Take 1 tablet (80 mg total) by mouth at bedtime. 90 tablet 3   ? metoprolol succinate (TOPROL-XL) 25 MG Take 1 tablet (25 mg total) by mouth daily. 90 tablet 3   ? nitroglycerin (NITROSTAT) 0.4 MG SL tablet Place 1 tablet (0.4 mg total) under the tongue every 5 (five) minutes as needed for chest pain. 30 tablet prn   ? sildenafiL (VIAGRA) 100 MG tablet Take 100 mg by mouth daily as needed for erectile dysfunction.       No current facility-administered medications for this visit.       No Known Allergies       Lab Results    Lipid profile 30 September 2020 revealed  mg/dL and HDL 38 mg/dL.          Medical History  Surgical History   Hypertension  GERD  Obstructive sleep apnea for which patient uses CPAP  Osteoarthritis  History of rotator cuff tear   Status post rotator cuff repair  Left knee arthroplasty

## 2021-07-21 ENCOUNTER — OFFICE VISIT - RIVER FALLS (OUTPATIENT)
Dept: FAMILY MEDICINE | Facility: CLINIC | Age: 66
End: 2021-07-21
Payer: MEDICARE

## 2021-07-21 ASSESSMENT — MIFFLIN-ST. JEOR: SCORE: 1991.8

## 2021-08-05 DIAGNOSIS — E78.5 DYSLIPIDEMIA: ICD-10-CM

## 2021-08-05 RX ORDER — ROSUVASTATIN CALCIUM 5 MG/1
5 TABLET, COATED ORAL AT BEDTIME
Qty: 90 TABLET | Refills: 1 | Status: SHIPPED | OUTPATIENT
Start: 2021-08-05

## 2021-08-22 ENCOUNTER — HEALTH MAINTENANCE LETTER (OUTPATIENT)
Age: 66
End: 2021-08-22

## 2021-09-21 ENCOUNTER — AMBULATORY - RIVER FALLS (OUTPATIENT)
Dept: FAMILY MEDICINE | Facility: CLINIC | Age: 66
End: 2021-09-21
Payer: MEDICARE

## 2021-09-21 ENCOUNTER — OFFICE VISIT - RIVER FALLS (OUTPATIENT)
Dept: FAMILY MEDICINE | Facility: CLINIC | Age: 66
End: 2021-09-21
Payer: MEDICARE

## 2021-09-21 ASSESSMENT — MIFFLIN-ST. JEOR: SCORE: 2016.3

## 2021-09-22 ENCOUNTER — COMMUNICATION - RIVER FALLS (OUTPATIENT)
Dept: FAMILY MEDICINE | Facility: CLINIC | Age: 66
End: 2021-09-22
Payer: MEDICARE

## 2021-09-22 LAB
BUN SERPL-MCNC: 15 MG/DL (ref 7–25)
BUN/CREAT RATIO - HISTORICAL: ABNORMAL (ref 6–22)
CALCIUM SERPL-MCNC: 9.7 MG/DL (ref 8.6–10.3)
CHLORIDE BLD-SCNC: 102 MMOL/L (ref 98–110)
CHOLEST SERPL-MCNC: 133 MG/DL
CHOLEST/HDLC SERPL: 3.5 {RATIO}
CO2 SERPL-SCNC: 29 MMOL/L (ref 20–32)
CREAT SERPL-MCNC: 0.88 MG/DL (ref 0.7–1.25)
EGFRCR SERPLBLD CKD-EPI 2021: 90 ML/MIN/1.73M2
GLUCOSE BLD-MCNC: 101 MG/DL (ref 65–99)
HDLC SERPL-MCNC: 38 MG/DL
LDLC SERPL CALC-MCNC: 75 MG/DL
NONHDLC SERPL-MCNC: 95 MG/DL
POTASSIUM BLD-SCNC: 4.9 MMOL/L (ref 3.5–5.3)
SODIUM SERPL-SCNC: 138 MMOL/L (ref 135–146)
TRIGL SERPL-MCNC: 121 MG/DL

## 2021-09-23 ENCOUNTER — COMMUNICATION - RIVER FALLS (OUTPATIENT)
Dept: FAMILY MEDICINE | Facility: CLINIC | Age: 66
End: 2021-09-23
Payer: MEDICARE

## 2021-09-28 LAB
ALBUMIN UR-MCNC: NEGATIVE G/DL
APPEARANCE UR: CLEAR
BILIRUB UR QL STRIP: NEGATIVE
COLOR UR AUTO: YELLOW
GLUCOSE UR STRIP-MCNC: NEGATIVE MG/DL
HGB UR QL STRIP: NEGATIVE
KETONES UR STRIP-MCNC: NEGATIVE MG/DL
LEUKOCYTE ESTERASE UR QL STRIP: NEGATIVE
NITRATE UR QL: NEGATIVE
PH UR STRIP: 7 [PH]
SP GR UR STRIP: 1.02
UROBILINOGEN UR STRIP-MCNC: NORMAL MG/DL

## 2021-09-29 ENCOUNTER — OFFICE VISIT (OUTPATIENT)
Dept: CARDIOLOGY | Facility: CLINIC | Age: 66
End: 2021-09-29
Payer: MEDICARE

## 2021-09-29 VITALS
HEART RATE: 76 BPM | RESPIRATION RATE: 16 BRPM | SYSTOLIC BLOOD PRESSURE: 130 MMHG | WEIGHT: 269.9 LBS | BODY MASS INDEX: 38.64 KG/M2 | HEIGHT: 70 IN | DIASTOLIC BLOOD PRESSURE: 80 MMHG

## 2021-09-29 DIAGNOSIS — I25.10 CORONARY ARTERY DISEASE INVOLVING NATIVE CORONARY ARTERY OF NATIVE HEART WITHOUT ANGINA PECTORIS: Primary | ICD-10-CM

## 2021-09-29 DIAGNOSIS — E78.5 DYSLIPIDEMIA: ICD-10-CM

## 2021-09-29 DIAGNOSIS — I25.10 CORONARY ARTERY DISEASE INVOLVING NATIVE CORONARY ARTERY WITHOUT ANGINA PECTORIS, UNSPECIFIED WHETHER NATIVE OR TRANSPLANTED HEART: ICD-10-CM

## 2021-09-29 DIAGNOSIS — R42 LIGHTHEADEDNESS: ICD-10-CM

## 2021-09-29 PROCEDURE — 99214 OFFICE O/P EST MOD 30 MIN: CPT | Performed by: INTERNAL MEDICINE

## 2021-09-29 ASSESSMENT — MIFFLIN-ST. JEOR: SCORE: 2015.51

## 2021-09-29 NOTE — PROGRESS NOTES
[facility] [address], [place] [address]   [email or web address]   OFFICE: [phone]          Thank you Jeff Torrez for asking the [facility] Heart Care team to participate in the care of your patient, Mustapha Carrero.     Impression and Plan     1. Coronary artery disease.  Gene has known coronary artery disease. Specifically, Gene underwent two-vessel coronary artery bypass graft surgery 25 March 2021 with ASHOK graft to the LAD and saphenous vein graft to the right PDA     On interview, Gene is doing well.  No concerning anginal type symptoms.  He has been participating in cardiac rehab without incident.     2.  Dyslipidemia.  Lipid profile 23 March 2021 revealed LDL 71 mg/dL and HDL 27 mg/dL.    Continue atorvastatin.    3.  Lightheadedness.   As noted below, Mustapha does report some intermittent lightheadedness.  He thinks it may be related to the metoprolol.  On his own volition he started taking his metoprolol in the evenings and this seemed to mitigate the symptoms.  Blood pressure was high normal today and has other blood pressures recently that were in the 110-120 systolic range.  Given favorable blood pressure readings otherwise, will discontinue the metoprolol to see if this further helps with his mild subjective lightheadedness.  He does have a home blood pressure monitoring device and I asked him to call should he have a tendency toward higher readings with cessation of the therapy.    Will tentatively plan on follow-up in approximately 1 year.    30 minutes spent reviewing prior records (including documentation, laboratory studies, cardiac testing/imaging), interview with patient along with physical exam, planning, and subsequent documentation/crafting of note.           History of Present Illness    Once again I would like to thank you again for asking me to participate in the care of your patient, Mustapha Carrero.  As you know,  but to reiterate for my own records, Mustapha Carrero is a 66 year old male with known coronary artery disease.  Specifically, Gene underwent two-vessel coronary artery bypass graft surgery 25 March 2021 with ASHOK graft to the LAD and saphenous vein graft to the right PDA.     On interview today, Gene denies any anginal type symptoms.  Breathing is comfortable.  He does report some intermittent lightheadedness.  He thinks it may be related to the metoprolol.  On his own volition he started taking his metoprolol in the evenings and this seemed to mitigate the symptoms.   He has been participating in cardiac rehab without incident.  No fevers, chills, or other constitutional symptoms.    Further review of systems is otherwise negative/noncontributory (medical record and 13 point review of systems reviewed as well and pertinent positives noted).         Cardiac Diagnostics      Echocardiogram 23 March 2021:  1. Normal left ventricular size and systolic performance with ejection fraction of 55 to 60%.  2. No significant valvular heart disease.  3. Normal right ventricular size and systolic performance.  4. Normal atrial dimensions.  5. Mild aortic root enlargement.    Coronary angiogram 23 March 2021 (precoronary artery bypass graft surgery):  1. Left main coronary artery: No significant stenosis.  2. Left anterior descending coronary artery: 50% mid vessel stenosis.  3. Circumflex coronary artery: Small vessel with proximal-mid 50% stenosis.  4. Right coronary artery: Mid 100% stenosis with subsequent 80% stenosis.  Right PDA with 100% stenosis.  Distal PDA fills faintly from collaterals from bridging collaterals.  5. Left ventriculography: Normal left ventricular systolic performance without wall motion abnormality.    Stress echocardiogram 5 March 2021 (precoronary angiogram):  1. Abnormal stress echocardiogram with evidence of post exercise hypokinesis involving the mid anterolateral, apical lateral and apex. All  "other segments are normal.  2. Normal baseline left ventricular systolic performance with ejection fraction of 55%.  3. The stress electrocardiogram was abnormal with 1 mm of horizontal ST segment depression in leads II, III, aVF, and V3-6, leads, and returning to baseline after 10 minutes.  4. No chest pain symptoms reported during test.  5. Normal exercise capacity for age/gender.    Ambulatory monitor May 2021:  1. No concerning rhythm disturbances.    Bilateral carotid ultrasound 23 March 2021:  1. Mild plaque formation, velocities consistent with less than 50% stenosis in the right internal carotid artery.  2. Mild plaque formation, velocities consistent with less than 50% stenosis in the left internal carotid artery.  3. Flow within the vertebral arteries is antegrade.         Physical Examination       /80 (BP Location: Left arm, Patient Position: Sitting, Cuff Size: Adult Large)   Pulse 76   Resp 16   Ht 1.778 m (5' 10\")   Wt 122.4 kg (269 lb 14.4 oz)   BMI 38.73 kg/m          Wt Readings from Last 3 Encounters:   09/29/21 122.4 kg (269 lb 14.4 oz)   05/17/21 122.9 kg (271 lb)   03/29/21 122.7 kg (270 lb 6.4 oz)       The patient is alert and oriented times three. Sclerae are anicteric. Mucosal membranes are moist. Jugular venous pressure is normal. No significant adenopathy/thyromegally appreciated. Lungs are clear with good expansion. On cardiovascular exam, the patient has a regular S1 and S2. Abdomen is soft and non-tender. Extremities reveal no clubbing, cyanosis, or edema.         Medications  Allergies   Current Outpatient Medications   Medication Sig Dispense Refill     acetaminophen (TYLENOL) 325 MG tablet [ACETAMINOPHEN (TYLENOL) 325 MG TABLET] Take 2 tablets (650 mg total) by mouth every 4 (four) hours as needed.  0     aspirin 81 mg chewable tablet [ASPIRIN 81 MG CHEWABLE TABLET] Chew 2 tablets (162 mg total) daily.  0     bisacodyL (DULCOLAX) 5 mg EC tablet [BISACODYL (DULCOLAX) 5 MG " EC TABLET] Take 2 tablets (10 mg total) by mouth daily as needed for constipation.  0     nitroglycerin (NITROSTAT) 0.4 MG SL tablet [NITROGLYCERIN (NITROSTAT) 0.4 MG SL TABLET] Place 1 tablet (0.4 mg total) under the tongue every 5 (five) minutes as needed for chest pain. 30 tablet prn     omeprazole (PRILOSEC) 20 MG capsule [OMEPRAZOLE (PRILOSEC) 20 MG CAPSULE] Take 20 mg by mouth daily before breakfast.       pediatric multivitamin-iron (POLY-VI-SOL WITH IRON) chewable tablet [PEDIATRIC MULTIVITAMIN-IRON (POLY-VI-SOL WITH IRON) CHEWABLE TABLET] Chew 1 tablet daily.       rosuvastatin (CRESTOR) 5 MG tablet Take 1 tablet (5 mg) by mouth At Bedtime 90 tablet 1     sildenafiL (VIAGRA) 100 MG tablet [SILDENAFIL (VIAGRA) 100 MG TABLET] Take 100 mg by mouth daily as needed for erectile dysfunction.         Allergies   Allergen Reactions     Nsaids GI Disturbance          Lab Results    Chemistry/lipid CBC Cardiac Enzymes/BNP/TSH/INR   Recent Labs   Lab Test 03/23/21  0752   CHOL 118   HDL 27*   LDL 71   TRIG 99     Recent Labs   Lab Test 03/23/21  0752   LDL 71     Recent Labs   Lab Test 03/29/21  0753 03/29/21  0555 03/28/21  2124   NA  --  138  --    POTASSIUM  --  3.4*  --    CHLORIDE  --  101  --    CO2  --  27  --     121   < >   BUN  --  18  --    CR  --  0.94  --    GFRESTIMATED  --  >60  --    CHRISTOPHER  --  8.3*  --     < > = values in this interval not displayed.     Recent Labs   Lab Test 03/29/21  0555 03/27/21 0450 03/26/21 0419   CR 0.94 0.89 0.88     Recent Labs   Lab Test 03/24/21  1219   A1C 5.5          Recent Labs   Lab Test 03/29/21 0555   WBC 10.7   HGB 10.5*   HCT 32.4*   MCV 84        Recent Labs   Lab Test 03/29/21  0555 03/27/21 0450 03/26/21  0419   HGB 10.5* 9.9* 11.6*    No results for input(s): TROPONINI in the last 40671 hours.  No results for input(s): BNP, NTBNPI, NTBNP in the last 57069 hours.  No results for input(s): TSH in the last 78899 hours.  Recent Labs   Lab Test  03/26/21  0419 03/25/21  1851 03/25/21  1733   INR 1.27* 1.22* 1.38*        Medical History  Surgical History Family History Social History   Past Medical History:   Diagnosis Date     Dyslipidemia      GERD (gastroesophageal reflux disease)      Hypertension      ANNA (obstructive sleep apnea)      Osteoarthritis      Past Surgical History:   Procedure Laterality Date     ARTHROPLASTY KNEE Left      ARTHROSCOPY SHOULDER ROTATOR CUFF REPAIR       CV CORONARY ANGIOGRAM N/A 3/23/2021    Procedure: Coronary Angiogram;  Surgeon: Byron Shanks MD;  Location: [facility] Cardiac Cath Lab;  Service: Cardiology     CV LEFT HEART CATHETERIZATION WITH LEFT VENTRICULOGRAM N/A 3/23/2021    Procedure: Left Heart Catheterization with Left Ventriculogram;  Surgeon: Byron Shanks MD;  Location: [facility] Cardiac Cath Lab;  Service: Cardiology     No family history on file.     Social History     Socioeconomic History     Marital status:      Spouse name: Not on file     Number of children: Not on file     Years of education: Not on file     Highest education level: Not on file   Occupational History     Not on file   Tobacco Use     Smoking status: Never Smoker     Smokeless tobacco: Never Used   Substance and Sexual Activity     Alcohol use: Yes     Comment: Alcoholic Drinks/day: occasional     Drug use: Never     Sexual activity: Not on file   Other Topics Concern     Not on file   Social History Narrative     Not on file     Social Determinants of Health     Financial Resource Strain:      Difficulty of Paying Living Expenses:    Food Insecurity:      Worried About Running Out of Food in the Last Year:      Ran Out of Food in the Last Year:    Transportation Needs:      Lack of Transportation (Medical):      Lack of Transportation (Non-Medical):    Physical Activity:      Days of Exercise per Week:      Minutes of Exercise per Session:    Stress:      Feeling of Stress :    Social Connections:      Frequency of  Communication with Friends and Family:      Frequency of Social Gatherings with Friends and Family:      Attends [employer] Services:      Active Member of Clubs or Organizations:      Attends Club or Organization Meetings:      Marital Status:    Intimate Partner Violence:      Fear of Current or Ex-Partner:      Emotionally Abused:      Physically Abused:      Sexually Abused:

## 2021-09-29 NOTE — LETTER
9/29/2021    JEFF GUEVARA MD  [facility] [address] [facility] [address]    RE: Mustapha Carrero       Dear Colleague,    I had the pleasure of seeing Mustapha Carrero in the [facility].           [facility]   [address], [place] [address]   [email or web address]   OFFICE: [phone]          Thank you Jeff Torrez for asking the [facility] team to participate in the care of your patient, Mustapha Carrero.     Impression and Plan     1. Coronary artery disease.  Gene has known coronary artery disease. Specifically, Gene underwent two-vessel coronary artery bypass graft surgery 25 March 2021 with ASHOK graft to the LAD and saphenous vein graft to the right PDA     On interview, Gene is doing well.  No concerning anginal type symptoms.  He has been participating in cardiac rehab without incident.     2.  Dyslipidemia.  Lipid profile 23 March 2021 revealed LDL 71 mg/dL and HDL 27 mg/dL.    Continue atorvastatin.    3.  Lightheadedness.   As noted below, Mustapha does report some intermittent lightheadedness.  He thinks it may be related to the metoprolol.  On his own volition he started taking his metoprolol in the evenings and this seemed to mitigate the symptoms.  Blood pressure was high normal today and has other blood pressures recently that were in the 110-120 systolic range.  Given favorable blood pressure readings otherwise, will discontinue the metoprolol to see if this further helps with his mild subjective lightheadedness.  He does have a home blood pressure monitoring device and I asked him to call should he have a tendency toward higher readings with cessation of the therapy.    Will tentatively plan on follow-up in approximately 1 year.    30 minutes spent reviewing prior records (including documentation, laboratory studies, cardiac testing/imaging), interview  with patient along with physical exam, planning, and subsequent documentation/crafting of note.           History of Present Illness    Once again I would like to thank you again for asking me to participate in the care of your patient, Mustapha Carrero.  As you know, but to reiterate for my own records, Mustapha Carrero is a 66 year old male with known coronary artery disease.  Specifically, Gene underwent two-vessel coronary artery bypass graft surgery 25 March 2021 with ASHOK graft to the LAD and saphenous vein graft to the right PDA.     On interview today, Gene denies any anginal type symptoms.  Breathing is comfortable.  He does report some intermittent lightheadedness.  He thinks it may be related to the metoprolol.  On his own volition he started taking his metoprolol in the evenings and this seemed to mitigate the symptoms.   He has been participating in cardiac rehab without incident.  No fevers, chills, or other constitutional symptoms.    Further review of systems is otherwise negative/noncontributory (medical record and 13 point review of systems reviewed as well and pertinent positives noted).         Cardiac Diagnostics      Echocardiogram 23 March 2021:  1. Normal left ventricular size and systolic performance with ejection fraction of 55 to 60%.  2. No significant valvular heart disease.  3. Normal right ventricular size and systolic performance.  4. Normal atrial dimensions.  5. Mild aortic root enlargement.    Coronary angiogram 23 March 2021 (precoronary artery bypass graft surgery):  1. Left main coronary artery: No significant stenosis.  2. Left anterior descending coronary artery: 50% mid vessel stenosis.  3. Circumflex coronary artery: Small vessel with proximal-mid 50% stenosis.  4. Right coronary artery: Mid 100% stenosis with subsequent 80% stenosis.  Right PDA with 100% stenosis.  Distal PDA fills faintly from collaterals from bridging collaterals.  5. Left ventriculography: Normal left  "ventricular systolic performance without wall motion abnormality.    Stress echocardiogram 5 March 2021 (precoronary angiogram):  1. Abnormal stress echocardiogram with evidence of post exercise hypokinesis involving the mid anterolateral, apical lateral and apex. All other segments are normal.  2. Normal baseline left ventricular systolic performance with ejection fraction of 55%.  3. The stress electrocardiogram was abnormal with 1 mm of horizontal ST segment depression in leads II, III, aVF, and V3-6, leads, and returning to baseline after 10 minutes.  4. No chest pain symptoms reported during test.  5. Normal exercise capacity for age/gender.    Ambulatory monitor May 2021:  1. No concerning rhythm disturbances.    Bilateral carotid ultrasound 23 March 2021:  1. Mild plaque formation, velocities consistent with less than 50% stenosis in the right internal carotid artery.  2. Mild plaque formation, velocities consistent with less than 50% stenosis in the left internal carotid artery.  3. Flow within the vertebral arteries is antegrade.         Physical Examination       /80 (BP Location: Left arm, Patient Position: Sitting, Cuff Size: Adult Large)   Pulse 76   Resp 16   Ht 1.778 m (5' 10\")   Wt 122.4 kg (269 lb 14.4 oz)   BMI 38.73 kg/m          Wt Readings from Last 3 Encounters:   09/29/21 122.4 kg (269 lb 14.4 oz)   05/17/21 122.9 kg (271 lb)   03/29/21 122.7 kg (270 lb 6.4 oz)       The patient is alert and oriented times three. Sclerae are anicteric. Mucosal membranes are moist. Jugular venous pressure is normal. No significant adenopathy/thyromegally appreciated. Lungs are clear with good expansion. On cardiovascular exam, the patient has a regular S1 and S2. Abdomen is soft and non-tender. Extremities reveal no clubbing, cyanosis, or edema.         Medications  Allergies   Current Outpatient Medications   Medication Sig Dispense Refill     acetaminophen (TYLENOL) 325 MG tablet [ACETAMINOPHEN " (TYLENOL) 325 MG TABLET] Take 2 tablets (650 mg total) by mouth every 4 (four) hours as needed.  0     aspirin 81 mg chewable tablet [ASPIRIN 81 MG CHEWABLE TABLET] Chew 2 tablets (162 mg total) daily.  0     bisacodyL (DULCOLAX) 5 mg EC tablet [BISACODYL (DULCOLAX) 5 MG EC TABLET] Take 2 tablets (10 mg total) by mouth daily as needed for constipation.  0     nitroglycerin (NITROSTAT) 0.4 MG SL tablet [NITROGLYCERIN (NITROSTAT) 0.4 MG SL TABLET] Place 1 tablet (0.4 mg total) under the tongue every 5 (five) minutes as needed for chest pain. 30 tablet prn     omeprazole (PRILOSEC) 20 MG capsule [OMEPRAZOLE (PRILOSEC) 20 MG CAPSULE] Take 20 mg by mouth daily before breakfast.       pediatric multivitamin-iron (POLY-VI-SOL WITH IRON) chewable tablet [PEDIATRIC MULTIVITAMIN-IRON (POLY-VI-SOL WITH IRON) CHEWABLE TABLET] Chew 1 tablet daily.       rosuvastatin (CRESTOR) 5 MG tablet Take 1 tablet (5 mg) by mouth At Bedtime 90 tablet 1     sildenafiL (VIAGRA) 100 MG tablet [SILDENAFIL (VIAGRA) 100 MG TABLET] Take 100 mg by mouth daily as needed for erectile dysfunction.         Allergies   Allergen Reactions     Nsaids GI Disturbance          Lab Results    Chemistry/lipid CBC Cardiac Enzymes/BNP/TSH/INR   Recent Labs   Lab Test 03/23/21  0752   CHOL 118   HDL 27*   LDL 71   TRIG 99     Recent Labs   Lab Test 03/23/21  0752   LDL 71     Recent Labs   Lab Test 03/29/21  0753 03/29/21  0555 03/28/21  2124   NA  --  138  --    POTASSIUM  --  3.4*  --    CHLORIDE  --  101  --    CO2  --  27  --     121   < >   BUN  --  18  --    CR  --  0.94  --    GFRESTIMATED  --  >60  --    CHRISTOPHER  --  8.3*  --     < > = values in this interval not displayed.     Recent Labs   Lab Test 03/29/21  0555 03/27/21  0450 03/26/21  0419   CR 0.94 0.89 0.88     Recent Labs   Lab Test 03/24/21  1219   A1C 5.5          Recent Labs   Lab Test 03/29/21  0555   WBC 10.7   HGB 10.5*   HCT 32.4*   MCV 84        Recent Labs   Lab Test  03/29/21  0555 03/27/21  0450 03/26/21  0419   HGB 10.5* 9.9* 11.6*    No results for input(s): TROPONINI in the last 22615 hours.  No results for input(s): BNP, NTBNPI, NTBNP in the last 41317 hours.  No results for input(s): TSH in the last 10709 hours.  Recent Labs   Lab Test 03/26/21  0419 03/25/21  1851 03/25/21  1733   INR 1.27* 1.22* 1.38*        Medical History  Surgical History Family History Social History   Past Medical History:   Diagnosis Date     Dyslipidemia      GERD (gastroesophageal reflux disease)      Hypertension      ANNA (obstructive sleep apnea)      Osteoarthritis      Past Surgical History:   Procedure Laterality Date     ARTHROPLASTY KNEE Left      ARTHROSCOPY SHOULDER ROTATOR CUFF REPAIR       CV CORONARY ANGIOGRAM N/A 3/23/2021    Procedure: Coronary Angiogram;  Surgeon: Byron Shanks MD;  Location: [facility] Cardiac [facility] Lab;  Service: Cardiology     CV LEFT HEART CATHETERIZATION WITH LEFT VENTRICULOGRAM N/A 3/23/2021    Procedure: Left Heart Catheterization with Left Ventriculogram;  Surgeon: Byron Shanks MD;  Location: [facility];  Service: Cardiology     No family history on file.     Social History     Socioeconomic History     Marital status:      Spouse name: Not on file     Number of children: Not on file     Years of education: Not on file     Highest education level: Not on file   Occupational History     Not on file   Tobacco Use     Smoking status: Never Smoker     Smokeless tobacco: Never Used   Substance and Sexual Activity     Alcohol use: Yes     Comment: Alcoholic Drinks/day: occasional     Drug use: Never     Sexual activity: Not on file   Other Topics Concern     Not on file   Social History Narrative     Not on file     Social Determinants of Health     Financial Resource Strain:      Difficulty of Paying Living Expenses:    Food Insecurity:      Worried About Running Out of Food in the Last Year:      Ran Out of Food in the Last Year:     Transportation Needs:      Lack of Transportation (Medical):      Lack of Transportation (Non-Medical):    Physical Activity:      Days of Exercise per Week:      Minutes of Exercise per Session:    Stress:      Feeling of Stress :    Social Connections:      Frequency of Communication with Friends and Family:      Frequency of Social Gatherings with Friends and Family:      Attends [employer] Services:      Active Member of Clubs or Organizations:      Attends Club or Organization Meetings:      Marital Status:    Intimate Partner Violence:      Fear of Current or Ex-Partner:      Emotionally Abused:      Physically Abused:      Sexually Abused:                       Thank you for allowing me to participate in the care of your patient.      Sincerely,     Theresa Gibbs MD     [facility]  cc:   No referring provider defined for this encounter.

## 2021-10-17 ENCOUNTER — HEALTH MAINTENANCE LETTER (OUTPATIENT)
Age: 66
End: 2021-10-17

## 2021-12-15 ENCOUNTER — TELEPHONE (OUTPATIENT)
Dept: CARDIOLOGY | Facility: CLINIC | Age: 66
End: 2021-12-15
Payer: MEDICARE

## 2021-12-15 DIAGNOSIS — I10 ESSENTIAL HYPERTENSION: Primary | ICD-10-CM

## 2021-12-15 NOTE — TELEPHONE ENCOUNTER
----- Message from Tammie Bustillo sent at 12/15/2021 11:11 AM CST -----  Regarding: АЛЕКСАНДР  General phone call:    Caller: Mustapha  Primary cardiologist: АЛЕКСАНДР  Detailed reason for call: Pt is calling and states his BP is high and he has been having headaches    Best phone number: [phone]  Best time to contact: anytime  Ok to leave a detailedmessage? yes  Device? no    Additional Info:

## 2021-12-15 NOTE — TELEPHONE ENCOUNTER
"Return call to patient who stated his BP has been \"creeping up\" since last visit with Dr. Gibbs (9-29-21) - readings are consistently around 155/95 and he has been experiencing mild headaches for past 1-2wks.    Patient confirmed Metoprolol was discontinued at last appt due to sx of lightheadedness which has resolved.  Patient reported that he had previously tolerated Lisinopril prior to heart surgery in March.    Informed patient that update would be forwarded to Dr. Gibbs for recommendations - understanding verbalized.     Please update from patient tj elevated BP - yrly follow-up pending 9/2022 - any new orders at this time?  mg    "

## 2021-12-17 RX ORDER — LISINOPRIL 20 MG/1
20 TABLET ORAL DAILY
Start: 2021-12-17 | End: 2022-04-14

## 2021-12-17 NOTE — TELEPHONE ENCOUNTER
Phone call to patient - informed him of Dr. Gibbs's response/recommendations - patient stated he had seen his PCP yesterday because his HA's were worsening and PCP prescribed Lisinopril 20mg daily which he started today - patient agreed to update PCP if he develops any new or worsening sx and return to see Dr. Gibbs 9/2022, sooner if needed.  mg

## 2021-12-17 NOTE — TELEPHONE ENCOUNTER
Msg rec'd 12-17-21 @ 0956:  Theresa Gibbs MD Gorshe, Maureen, RN  Lets go ahead and reinstitute lisinopril given he seemed to tolerate this well in the past.  Would recommend starting 10 mg daily.  Can continue to follow blood pressure and provide updates as needed.  Thanks.

## 2022-01-03 ENCOUNTER — TELEPHONE (OUTPATIENT)
Dept: CARDIOLOGY | Facility: CLINIC | Age: 67
End: 2022-01-03
Payer: MEDICARE

## 2022-01-03 DIAGNOSIS — I10 ESSENTIAL HYPERTENSION: Primary | ICD-10-CM

## 2022-01-03 NOTE — TELEPHONE ENCOUNTER
----- Message from Maryann Felipe sent at 1/3/2022  9:55 AM CST -----  Regarding: АЛЕКСАНДР PATIENT  General phone call:    Caller: PATIENT    Primary cardiologist: АЛЕКСАНДР    Detailed reason for call: PATIENT HAS QUESTIONS RE: BP RUNNING HIGHER THAN USUAL, PLEASE CALL AND ADVISE  .   New or active symptoms? YES    Best phone number: [phone]    Best time to contact: ANY    Ok to leave a detailedmessage? YES    Device? NO    Additional Info:

## 2022-01-03 NOTE — TELEPHONE ENCOUNTER
Dr. Gibbs,  Please see patient update below.  Follow up is planned 9/2022.  Any new recommendations?  Thank you,  Alicia    ---------------------------  Called patient for updates on his blood pressure.   Patients PCP increased lisinopril to 20 mg 12/16/21 and he is now taking 40 mg daily since 12/29/21.  Blood pressures are running : 140/90 to 145/92 with heart rate 66 bpm at clinic and at home. Patient denies lightheadedness, dizziness, shortness of breath, palpitations and chest pain.  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  He is exercising 3 days a week at a fitness center. Encouraged patient to limit his sodium intake to 2,000 mg or less a day and limit alcohol intake.  Informed patient Dr. Gibbs is out of the clinic today and will review upon his return. Patient verbalized understanding and is thankful for the call.

## 2022-01-04 RX ORDER — AMLODIPINE BESYLATE 5 MG/1
5 TABLET ORAL DAILY
Qty: 30 TABLET | Refills: 11 | Status: SHIPPED | OUTPATIENT
Start: 2022-01-04

## 2022-01-04 NOTE — TELEPHONE ENCOUNTER
Called patient with recommendation to add amlodipine to medication regimen and follow up with his PCP in 2 weeks to reassess blood pressure. Patient verbalized understanding and is willing to initiate amlodipine 5 mg daily. Follow up is planned 9/2022. Patient will call in the interim with any further questions or concerns after following up with his PCP for blood pressure reassessment.   ----------------------------------------------------------  Theresa Gibbs MD  You 1 hour ago (7:43 AM)     FR    Note reviewed.  Blood pressure requirements do seem a bit stringent to me.  In any event, would advocate adding amlodipine 5 mg daily to medical regimen to optimize blood pressure.  Recommend that patient follow-up with primary provider in approximately 2 weeks to reassess blood pressure.

## 2022-02-11 VITALS
BODY MASS INDEX: 40.43 KG/M2 | HEIGHT: 69 IN | HEIGHT: 69 IN | SYSTOLIC BLOOD PRESSURE: 132 MMHG | TEMPERATURE: 98.5 F | WEIGHT: 282 LBS | BODY MASS INDEX: 41.77 KG/M2 | DIASTOLIC BLOOD PRESSURE: 94 MMHG | SYSTOLIC BLOOD PRESSURE: 146 MMHG | WEIGHT: 273 LBS | HEART RATE: 72 BPM | DIASTOLIC BLOOD PRESSURE: 78 MMHG | HEART RATE: 70 BPM

## 2022-02-11 VITALS
HEART RATE: 70 BPM | WEIGHT: 272 LBS | BODY MASS INDEX: 40.29 KG/M2 | BODY MASS INDEX: 40.73 KG/M2 | DIASTOLIC BLOOD PRESSURE: 86 MMHG | HEIGHT: 69 IN | SYSTOLIC BLOOD PRESSURE: 136 MMHG | WEIGHT: 275 LBS | HEART RATE: 56 BPM | TEMPERATURE: 96.5 F | HEIGHT: 69 IN | DIASTOLIC BLOOD PRESSURE: 76 MMHG | TEMPERATURE: 98.1 F | SYSTOLIC BLOOD PRESSURE: 126 MMHG

## 2022-02-11 VITALS
SYSTOLIC BLOOD PRESSURE: 154 MMHG | DIASTOLIC BLOOD PRESSURE: 81 MMHG | BODY MASS INDEX: 40.39 KG/M2 | HEART RATE: 60 BPM | TEMPERATURE: 96.7 F | WEIGHT: 272.7 LBS | HEIGHT: 69 IN

## 2022-02-11 VITALS
SYSTOLIC BLOOD PRESSURE: 140 MMHG | DIASTOLIC BLOOD PRESSURE: 78 MMHG | TEMPERATURE: 97.1 F | HEIGHT: 69 IN | HEART RATE: 64 BPM | WEIGHT: 273 LBS | BODY MASS INDEX: 40.43 KG/M2

## 2022-02-11 VITALS
TEMPERATURE: 97.1 F | HEART RATE: 72 BPM | WEIGHT: 276 LBS | DIASTOLIC BLOOD PRESSURE: 82 MMHG | HEIGHT: 69 IN | BODY MASS INDEX: 40.88 KG/M2 | SYSTOLIC BLOOD PRESSURE: 136 MMHG

## 2022-02-11 VITALS
HEIGHT: 69 IN | DIASTOLIC BLOOD PRESSURE: 82 MMHG | TEMPERATURE: 97.6 F | SYSTOLIC BLOOD PRESSURE: 128 MMHG | OXYGEN SATURATION: 95 % | HEART RATE: 63 BPM | WEIGHT: 274.8 LBS | BODY MASS INDEX: 40.7 KG/M2

## 2022-02-11 VITALS
DIASTOLIC BLOOD PRESSURE: 80 MMHG | OXYGEN SATURATION: 98 % | TEMPERATURE: 97.4 F | HEIGHT: 69 IN | SYSTOLIC BLOOD PRESSURE: 120 MMHG | HEART RATE: 59 BPM | BODY MASS INDEX: 39.59 KG/M2 | WEIGHT: 267.3 LBS

## 2022-02-11 VITALS
TEMPERATURE: 97.4 F | BODY MASS INDEX: 40.97 KG/M2 | SYSTOLIC BLOOD PRESSURE: 128 MMHG | HEART RATE: 72 BPM | DIASTOLIC BLOOD PRESSURE: 86 MMHG | HEIGHT: 69 IN | WEIGHT: 276.6 LBS

## 2022-02-11 VITALS
HEART RATE: 72 BPM | HEIGHT: 69 IN | BODY MASS INDEX: 41.65 KG/M2 | TEMPERATURE: 98 F | SYSTOLIC BLOOD PRESSURE: 136 MMHG | DIASTOLIC BLOOD PRESSURE: 76 MMHG | WEIGHT: 281.2 LBS

## 2022-02-11 VITALS
DIASTOLIC BLOOD PRESSURE: 76 MMHG | WEIGHT: 276.4 LBS | HEART RATE: 78 BPM | HEIGHT: 69 IN | BODY MASS INDEX: 40.94 KG/M2 | SYSTOLIC BLOOD PRESSURE: 128 MMHG | TEMPERATURE: 97.5 F

## 2022-02-11 VITALS
WEIGHT: 270 LBS | DIASTOLIC BLOOD PRESSURE: 90 MMHG | BODY MASS INDEX: 39.99 KG/M2 | HEIGHT: 69 IN | HEART RATE: 68 BPM | TEMPERATURE: 98.5 F | SYSTOLIC BLOOD PRESSURE: 130 MMHG

## 2022-02-16 NOTE — NURSING NOTE
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    Weight Measured :   282 lb(Converted to: 282 lb 0 oz, 127.913 kg)    Height Measured :   69.25 in(Converted to: 5 ft 9 in, 175.89 cm)    Body Mass Index :   41.34 kg/m2 (HI)    Body Surface Area :   2.5 m2   Systolic Blood Pressure :   146 mmHg (HI)    Diastolic Blood Pressure :   94 mmHg (HI)    Mean Arterial Pressure :   111 mmHg   Peripheral Pulse Rate :   70 bpm   BP Site :   Right arm   Pulse Site :   Radial artery   BP Method :   Manual   HR Method :   Manual   Temperature Tympanic :   98.5 DegF(Converted to: 36.9 DegC)    Margarita De LPN - 3/4/2021 11:09 AM CST   Health Status   Allergies Verified? :   Yes   Medication History Verified? :   Yes   Pre-Visit Planning Status :   Completed   Tobacco Use? :   Never smoker   Margarita De LPN - 3/4/2021 11:09 AM CST   Consents   Consent for Immunization Exchange :   Consent Granted   Consent for Immunizations to Providers :   Consent Granted   Margarita De LPN - 3/4/2021 11:09 AM CST   Meds / Allergies   (As Of: 3/4/2021 11:18:20 AM CST)   Allergies (Active)   ibuprofen  Estimated Onset Date:   Unspecified ; Reactions:   GI upset ; Created By:   Cheri Harris CMA; Reaction Status:   Active ; Category:   Drug ; Substance:   ibuprofen ; Type:   Intolerance ; Updated By:   Cheri Harris CMA; Reviewed Date:   3/4/2021 11:16 AM CST        Medication List   (As Of: 3/4/2021 11:18:20 AM CST)   Prescription/Discharge Order    lisinopril  :   lisinopril ; Status:   Prescribed ; Ordered As Mnemonic:   lisinopril 10 mg oral tablet ; Simple Display Line:   10 mg, 1 tab(s), Oral, daily, for 90 day(s), 90 tab(s), 3 Refill(s) ; Ordering Provider:   Jeff Haider MD; Catalog Code:    lisinopril ; Order Dt/Tm:   12/14/2020 10:34:04 AM CST          Miscellaneous Rx Supply  :   Miscellaneous Rx Supply ; Status:   Prescribed ; Ordered As Mnemonic:   heated tubing, filters, nasal mask with cushion replacment ; Simple Display Line:   See Instructions, use as directed, 1 EA, 0 Refill(s) ; Ordering Provider:   Jeff Haider MD; Catalog Code:   Miscellaneous Rx Supply ; Order Dt/Tm:   10/11/2016 5:53:33 PM CDT ; Comment:   Months = 99 (Lifetime)          omeprazole  :   omeprazole ; Status:   Prescribed ; Ordered As Mnemonic:   omeprazole 20 mg oral delayed release capsule ; Simple Display Line:   20 mg, 1 cap(s), Oral, bid, for 90 day(s), 180 cap(s), 1 Refill(s) ; Ordering Provider:   Jeff Haider MD; Catalog Code:   omeprazole ; Order Dt/Tm:   12/14/2020 10:34:03 AM CST          sildenafil  :   sildenafil ; Status:   Prescribed ; Ordered As Mnemonic:   sildenafil 100 mg oral tablet ; Simple Display Line:   100 mg, 1 tab(s), Oral, daily, 30 tab(s), 2 Refill(s) ; Ordering Provider:   Jeff Haider MD; Catalog Code:   sildenafil ; Order Dt/Tm:   11/18/2019 11:49:27 AM CST            Home Meds    carbonyl iron  :   carbonyl iron ; Status:   Documented ; Ordered As Mnemonic:   Iron Chews ; Simple Display Line:   65 mg, Oral, daily, 0 Refill(s) ; Catalog Code:   carbonyl iron ; Order Dt/Tm:   9/30/2020 9:11:28 AM CDT          multivitamin  :   multivitamin ; Status:   Documented ; Ordered As Mnemonic:   Daily Multiple Vitamins ; Simple Display Line:   1 tab(s), po, daily, 0 Refill(s) ; Catalog Code:   multivitamin ; Order Dt/Tm:   10/3/2016 12:37:06 PM CDT            ID Risk Screen   Recent Travel History :   No recent travel   Family Member Travel History :   No recent travel   Other Exposure to Infectious Disease :   Unknown   COVID-19 Testing Status :   No COVID-19 test performed   Margarita De LPN - 3/4/2021 11:09 AM CST

## 2022-02-16 NOTE — TELEPHONE ENCOUNTER
---------------------  From: Jeff Haider MD   To: MAX RUVALCABA    Sent: 8/13/2020 11:20:02 AM CDT  Subject: Patient Message - Results Notification     Your recent chest  x-ray was normal.  The blood tests look good except for a slight low hemoglobin.  This may be due to a mild iron deficiency anemia.  Please start a once a day iron supplement and have the blood count rechecked in about 6 weeks.  A clear cause for the cough is not apparent from the tests.  Please stop the lisinopril to see if this could be the cause of the cough.  You will need to monitor your blood pressure to make sure it does not get too high.  We can start a different blood pressure medication if needed.  Please update me on the cough in about 10 days.  At that time we can go over your blood pressure numbers.      Results:  Date Result Name Ind Value Ref Range   8/12/2020 8:57 AM Sodium Level  138 mmol/L (135 - 146)   8/12/2020 8:57 AM Potassium Level  4.7 mmol/L (3.5 - 5.3)   8/12/2020 8:57 AM Chloride Level  107 mmol/L (98 - 110)   8/12/2020 8:57 AM CO2 Level  27 mmol/L (20 - 32)   8/12/2020 8:57 AM Glucose Level ((H)) 110 mg/dL (65 - 99)   8/12/2020 8:57 AM BUN  19 mg/dL (7 - 25)   8/12/2020 8:57 AM Creatinine Level  1.10 mg/dL (0.70 - 1.25)   8/12/2020 8:57 AM eGFR  71 mL/min/1.73m2 (> OR = 60 - )   8/12/2020 8:57 AM Calcium Level  9.4 mg/dL (8.6 - 10.3)   8/12/2020 8:57 AM WBC  7.8 (3.8 - 10.8)   8/12/2020 8:57 AM RBC  4.96 (4.20 - 5.80)   8/12/2020 8:57 AM Hgb ((L)) 12.8 gm/dL (13.2 - 17.1)   8/12/2020 8:57 AM Hct  39.5 % (38.5 - 50.0)   8/12/2020 8:57 AM MCV ((L)) 79.6 fL (80.0 - 100.0)   8/12/2020 8:57 AM MCH ((L)) 25.8 pg (27.0 - 33.0)   8/12/2020 8:57 AM MCHC  32.4 gm/dL (32.0 - 36.0)   8/12/2020 8:57 AM RDW ((H)) 15.6 % (11.0 - 15.0)   8/12/2020 8:57 AM Platelet  325 (140 - 400)   8/12/2020 8:57 AM MPV  9.2 fL (7.5 - 12.5)   8/12/2020 8:57 AM Lymphocytes  30.7 %    8/12/2020 8:57 AM Abs Lymphocytes  2,395 (850 - 3,900)    8/12/2020 8:57 AM Neutrophils  54.3 %    8/12/2020 8:57 AM Abs Neutrophils  4,235 (1,500 - 7,800)   8/12/2020 8:57 AM Monocytes  10.1 %    8/12/2020 8:57 AM Abs Monocytes  788 (200 - 950)   8/12/2020 8:57 AM Eosinophils  4.1 %    8/12/2020 8:57 AM Abs Eosinophils  320 (15 - 500)   8/12/2020 8:57 AM Basophils  0.8 %    8/12/2020 8:57 AM Abs Basophils  62 (0 - 200)

## 2022-02-16 NOTE — TELEPHONE ENCOUNTER
---------------------  From: Bisi Cespedes LPN (Phone Messages Pool ([address] [place] [address]))   To: [facility] Message Pool ([address] [place] [address]); MAX RUVALCABA    Sent: 6/25/2021 2:22:13 PM CDT  Subject: CONSUMER MESSAGE FW: Dr De Leon Class B CDL     Dr. Vitaly Ogden is out of clinic today. He will be back in clinic Monday. I will forward this to him to see what needs to be done.    Thanks,  Bisi DECKER LPN        ---------------------  From: MAX RUVALCABA  To: [facility]  Sent: 06/25/2021 02:13 p.m. CDT  Subject: Dr De Leon Class TIERENY CHAVEZL  I had heart bypass surgery March 25 2021. It s been 90 days since surgery. 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 I think my employer would like a letter to clear me. Do I need a medical evaluation or checkup? Thanks Mustapha Ruvalcaba---------------------  From: Margarita De LPN ([facility] Message Pool ([address] [place] [address]))   To: Jeff Haider MD;     Sent: 6/28/2021 7:33:33 AM CDT  Subject: FW: CONSUMER MESSAGE FW: Dr De Leon Class TIERNEY CDL---------------------  From: Jeff Haider MD   To: [facility] Message Pool ([address] [place] [address]);     Sent: 6/28/2021 12:20:38 PM CDT  Subject: RE: CONSUMER MESSAGE FW: Dr De Leon Class B CDL     He should come in for an exam.---------------------  From: Margarita De LPN ([facility] Message Pool ([address] [place] [address]))   To: MAX RUVALCABA    Sent: 6/28/2021 12:42:34 PM CDT  Subject: FW: CONSUMER MESSAGE FW: Dr Haider-HARMONY Class B CDL     Good Afternoon Mustapha,  Dr Haider would like you to come into the clinic to be evaluated. Please contact the schedules to make that appointment [phone]. Thanks,   Margarita KOROMA

## 2022-02-16 NOTE — NURSING NOTE
Comprehensive Intake Entered On:  7/21/2021 2:15 PM CDT    Performed On:  7/21/2021 2:08 PM CDT by Hung BRAXTON, Angelica               Summary   Chief Complaint :   needs DOT clearance post heart surgery.   Weight Measured :   267.3 lb(Converted to: 267 lb 5 oz, 121.245 kg)    Height Measured :   69.25 in(Converted to: 5 ft 9 in, 175.89 cm)    Body Mass Index :   39.18 kg/m2 (HI)    Body Surface Area :   2.43 m2   Systolic Blood Pressure :   144 mmHg (HI)    Diastolic Blood Pressure :   82 mmHg (HI)    Mean Arterial Pressure :   103 mmHg   Peripheral Pulse Rate :   59 bpm (LOW)    BP Site :   Right arm   Pulse Site :   Radial artery   BP Method :   Electronic   HR Method :   Electronic   Temperature Tympanic :   97.4 DegF(Converted to: 36.3 DegC)  (LOW)    Oxygen Saturation :   98 %   Angelica Persaud MA - 7/21/2021 2:08 PM CDT   Health Status   Allergies Verified? :   Yes   Medication History Verified? :   Yes   Medical History Verified? :   Yes   Pre-Visit Planning Status :   Completed   Tobacco Use? :   Never smoker   Angelica Persaud MA - 7/21/2021 2:08 PM CDT   Consents   Consent for Immunization Exchange :   Consent Granted   Consent for Immunizations to Providers :   Consent Granted   Angelica Persaud MA - 7/21/2021 2:08 PM CDT   Meds / Allergies   (As Of: 7/21/2021 2:15:24 PM CDT)   Allergies (Active)   ibuprofen  Estimated Onset Date:   Unspecified ; Reactions:   GI upset ; Created By:   Cehri Harris CMA; Reaction Status:   Active ; Category:   Drug ; Substance:   ibuprofen ; Type:   Intolerance ; Updated By:   Cheri Harris CMA; Reviewed Date:   3/4/2021 11:16 AM CST        Medication List   (As Of: 7/21/2021 2:15:24 PM CDT)   Prescription/Discharge Order    Miscellaneous Rx Supply  :   Miscellaneous Rx Supply ; Status:   Prescribed ; Ordered As Mnemonic:   heated tubing, filters, nasal mask with cushion replacment ; Simple Display Line:   See Instructions, use as directed, 1 EA, 0 Refill(s) ; Ordering  Provider:   Jeff Haider MD; Catalog Code:   Miscellaneous Rx Supply ; Order Dt/Tm:   10/11/2016 5:53:33 PM CDT ; Comment:   Months = 99 (Lifetime)          omeprazole  :   omeprazole ; Status:   Prescribed ; Ordered As Mnemonic:   omeprazole 20 mg oral delayed release capsule ; Simple Display Line:   20 mg, 1 cap(s), Oral, bid, for 90 day(s), 180 cap(s), 1 Refill(s) ; Ordering Provider:   Jeff Haider MD; Catalog Code:   omeprazole ; Order Dt/Tm:   12/14/2020 10:34:03 AM CST          sildenafil  :   sildenafil ; Status:   Prescribed ; Ordered As Mnemonic:   sildenafil 100 mg oral tablet ; Simple Display Line:   100 mg, 1 tab(s), Oral, daily, 30 tab(s), 2 Refill(s) ; Ordering Provider:   Jeff Haider MD; Catalog Code:   sildenafil ; Order Dt/Tm:   11/18/2019 11:49:27 AM CST            Home Meds    aspirin  :   aspirin ; Status:   Documented ; Ordered As Mnemonic:   aspirin 81 mg oral delayed release tablet ; Simple Display Line:   162 mg, 2 tab(s), Oral, daily, 0 Refill(s) ; Catalog Code:   aspirin ; Order Dt/Tm:   7/21/2021 2:13:34 PM CDT          atorvastatin  :   atorvastatin ; Status:   Discontinued ; Ordered As Mnemonic:   atorvastatin 80 mg oral tablet ; Simple Display Line:   80 mg, 1 tab(s), Oral, daily, 30 tab(s), 0 Refill(s) ; Catalog Code:   atorvastatin ; Order Dt/Tm:   4/5/2021 12:09:07 PM CDT          metoprolol  :   metoprolol ; Status:   Documented ; Ordered As Mnemonic:   metoprolol succinate 25 mg oral tablet, extended release ; Simple Display Line:   25 mg, 1 tab(s), Oral, daily, 30 tab(s), 0 Refill(s) ; Catalog Code:   metoprolol ; Order Dt/Tm:   4/5/2021 12:09:53 PM CDT          multivitamin  :   multivitamin ; Status:   Documented ; Ordered As Mnemonic:   Daily Multiple Vitamins ; Simple Display Line:   1 tab(s), po, daily, 0 Refill(s) ; Catalog Code:   multivitamin ; Order Dt/Tm:   10/3/2016 12:37:06 PM CDT          rosuvastatin  :   rosuvastatin ; Status:   Documented ;  Ordered As Mnemonic:   rosuvastatin 5 mg oral tablet ; Simple Display Line:   5 mg, 1 tab(s), Oral, daily, 0 Refill(s) ; Catalog Code:   rosuvastatin ; Order Dt/Tm:   7/21/2021 2:12:54 PM CDT            Social History   Social History   (As Of: 7/21/2021 2:15:24 PM CDT)   Alcohol:  Current      Current   (Last Updated: 11/29/2011 12:20:21 PM CST by Angelica Larsen)          Tobacco:        Never (less than 100 in lifetime)   (Last Updated: 12/14/2020 9:25:30 AM CST by Margarita De LPN)          Electronic Cigarette/Vaping:        Electronic Cigarette Use: Never.   (Last Updated: 12/14/2020 9:25:34 AM CST by Margarita De LPN)          Substance Abuse:        Never   (Last Updated: 11/29/2011 12:20:37 PM CST by Angelica Larsen)          Employment/School:        Work/School description: .   (Last Updated: 11/29/2011 12:29:46 PM CST by Angelica Larsen)          Home/Environment:        Marital status:  (Living together).  Spouse/Partner name: Darlene.   (Last Updated: 11/29/2011 12:30:55 PM CST by Angelica Larsen)          Exercise:        Exercise type: Exercise with farming..   (Last Updated: 11/29/2011 12:26:33 PM CST by Angelica Larsen)   Exercise type: Bicycling, back-strengthening exercises.   (Last Updated: 11/29/2011 12:37:24 PM CST by Angelica Larsen)

## 2022-02-16 NOTE — LETTER
(Inserted Image. Unable to display)   [birth year]ele 10, 2018        MAX RUVALCABA   [address] [place], [place] [id number]        Dear MAX,      Thank you for selecting [facility] (previously [facility]) for your healthcare needs.    You previously had a colon cancer screening (colonoscopy or Cologuard) at our facility and our records indicate you are now due for a follow up.  Although there may not be a problem at this time, it is our medical recommendation that you schedule an appointment for an exam with your Healthcare Provider to assess your health history to determine the proper colon cancer screening needed.  This exam can be combined with any scheduled appointment with your primary physician (physicals, medication checks, etc.)    Your Healthcare Provider will then complete the appropriate colon cancer screening order form for either a colonoscopy or Cologuard screening.   For a colonoscopy order, we encourage you to schedule your appointment that day.  Please note that depending on the type of anesthetic you are to receive your order will  in 30 or 90 days from your visit with your Healthcare Provider.  If you do not set up your colonoscopy during this time frame you will need to have another exam as your health history may have changed.    If you are seeing another physician for this problem, we would appreciate knowing so we do not send you another reminder.    To schedule an appointment or if you have further questions, please contact your primary clinic:   [facility]       [phone]   Former[facility]       [phone]              [facility]     [phone]      Powered by [employer]    Sincerely,    Jeff Haider M.D.

## 2022-02-16 NOTE — PROGRESS NOTES
Chief Complaint    med check, due for fasting labs.  History of Present Illness      Patient is here for his medication check up.  He is fasting and is due for labs.  He would like a flu shot today.  He would like to increase his Viagra dose to 100mg daily.  He is still taking lisinopril 10mg daily for HTN and omeprazole 20mg daily for acid reflux.  He has tried going off omeprazole in the past but his acid reflux worsens when not on medication.  He has also tried ranitidine in the past.      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  He does have family history of heart disease in both his parents.  Review of Systems      Constitutional: Negative.      Eye: Negative.      Ear/Nose/Mouth/Throat: Negative.      Respiratory: Negative.      Cardiovascular: Negative.      Gastrointestinal: Negative.      Genitourinary: Negative.      Hematology/Lymphatics: Negative.      Endocrine: Negative.      Immunologic: Negative.      Musculoskeletal: Negative.      Integumentary: Negative.      Neurologic: Negative.      Psychiatric: Negative.      All other systems reviewed and negative         Physical Exam   Vitals & Measurements    T: 96.5   F (Tympanic)  HR: 56(Peripheral)  BP: 136/86     HT: 69.25 in  WT: 272 lb  BMI: 39.87           General:  Alert and oriented, No acute distress.            Eye:  Pupils are equal, round and reactive to light, Normal conjunctiva.            HENT:  Oral mucosa is moist.            Neck:  Supple.            Respiratory:  Respirations are non-labored.            Cardiovascular:  Normal rate, Regular rhythm, No edema.            Gastrointestinal:  Non-distended.            Musculoskeletal:  Normal gait.            Integumentary:  Warm, No rash.            Psychiatric:  Cooperative, Appropriate mood & affect, Normal judgment.             Assessment/Plan       1. Hypertension (I10)         Stable, continue with current  medication, refills sent in.  BMP today.                2. Chronic heartburn (R12)         Continue with omeprazole, will look into further testing.                3. ANNA on CPAP (G47.33)         Reviewed past sleep study report and is stable, continue with CPAP use.                4. Screening for lipid disorders (Z13.220)         Lipid panel today.                5. Family history of heart disease (Z82.49)         Discussed improving diet, increase exercise/activity level.  Also discussed coronary calcium score testing, starting statins.  Will wait for lipid panel results.                6. Encounter for immunization (Z23)         Flu shot today.         Ordered:          influenza virus vaccine, inactivated, 0.5 mL, IM, once, (Completed)          89633 imadm prq id subq/im njxs 1 vaccine (Charge), Quantity: 1, Encounter for immunization          21901 Influenza virus vaccine, quadrivalent, riv4 (Charge), Quantity: 1, Encounter for immunization                Orders:         lisinopril, = 1 tab(s), Oral, daily, # 90 tab(s), 3 Refill(s), Type: Maintenance, Pharmacy: [facility] #[id number], Needs appt for further refills, 1 tab(s) Oral daily, (Ordered)         omeprazole, = 1 cap(s), Oral, daily, # 90 cap(s), 3 Refill(s), Type: Maintenance, Pharmacy: [facility] #[id number], Needs appt for further refills, 1 cap(s) Oral daily, (Ordered)         sildenafil, = 1 tab(s) ( 100 mg ), Oral, daily, # 30 tab(s), 2 Refill(s), Type: Maintenance, Pharmacy: [facility] #[id number], 1 tab(s) Oral daily, (Ordered)         Basic Metabolic Panel* ([facility]), Specimen Type: Serum, Collection Date: 11/18/19 11:41:00 CST         Hemoglobin A1c* ([facility]), Specimen Type: Blood, Collection Date: 11/18/19 11:47:00 CST         Lipid panel with reflex to direct ldl* ([facility]), Specimen Type: Serum, Collection Date: 11/18/19 11:41:00 CST         Return to Clinic (Request), RFV: HTN f/u, refill meds, prestanding labs., Return in 1  year      I, Angelica Persaud MA, acted solely as a scribe for, and in the presence of Dr. Jeff Haider who performed the services.             I, Jeff Haider MD, personally performed the services described in this documentation.  The documentation was scribed in my presence and is both accurate and complete.                Patient Information     Name:MAX RUVALCABA      Address:      [address] [id number]     Sex:Male     YOB: 1955     [phone]     Emergency Contact:DAVID RUVALCABA     MRN:[id number]     FIN:[id number]     Location:[facility]     Date of Service:11/18/2019      Primary Care Physician:       Jeff Haider MD, [phone]      Attending Physician:       Jeff Haider MD, [phone]  Problem List/Past Medical History    Ongoing     Chronic heartburn     Hypertension     Obesity     ANNA on CPAP       Comments: Moderate ANNA with AHI 19 CPAP 9     Osteoarthritis of Knee     Osteoarthritis of right shoulder       Comments: With an associated large rotator cuff tear.    Historical     Rotator cuff tear  Procedure/Surgical History     Colonoscopy (09/10/2019)            Comments: Sedation: conscious      Polyps: none      Diverticuli: no      Follow up: 10 years (Sept 2029).     Left rotator cuff repair (12/15/2015)           Revision total joint arthroplasty, left knee (11/17/2011)           Arthroscopy of knee (03/08/2010)            Comments: Left knee surgery/Dr. Abdi, [place], [place]..     Colonoscopy (07/15/2009)            Comments: Indication:  screening      Sedation:  Midazolam, Fentanyl--IV      Findings:  no polyps seen      Recommendation:  f/u 10yrs.     Replacement of left knee joint (01/08/2008)           Spermatocelectomy (09/17/2007)            Comments: Dr. Gallardo.  Medications    Daily Multiple Vitamins, 1 tab(s), Oral, daily    heated tubing, filters, nasal mask with cushion replacment, See  Instructions    lisinopril 10 mg oral tablet, 1 tab(s), Oral, daily, 3 refills    omeprazole 20 mg oral delayed release capsule, 1 cap(s), Oral, daily, 3 refills    sildenafil 100 mg oral tablet, 100 mg= 1 tab(s), Oral, daily, 2 refills  Allergies    ibuprofen (GI upset)  Social History    Smoking Status - 11/18/2019     Never smoker     Alcohol - Current, 11/29/2011      Current, 11/29/2011     Employment/School      Work/School description: ., 11/29/2011     Exercise      Exercise type: Bicycling, back-strengthening exercises., 11/29/2011      Exercise type: Exercise with farming.., 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 Spouse/Partner name: Darlene., 11/29/2011     Substance Abuse      Never, 11/29/2011     Tobacco      Never, 11/29/2011  Family History    Heart disease: Mother and Father.    Hypercholesterolemia: Father.    Hypertension: Mother and Father.    Myocardial infarction: Mother.    Sleep apnea syndrome: Mother.  Immunizations      Vaccine Date Status      influenza virus vaccine, inactivated 11/18/2019 Given      influenza virus vaccine, inactivated 10/08/2018 Given      tetanus/diphth/pertuss (Tdap) adult/adol 10/03/2016 Given      influenza virus vaccine, inactivated 10/03/2016 Given      influenza virus vaccine, inactivated 11/20/2014 Given      influenza 11/19/2011 Recorded      Td 01/25/2006 Recorded      Td 02/24/2004 Recorded      Td 03/22/1995 Recorded  Lab Results       Lab Results (Last 4 results within 90 days)        UA pH: 6 [5  - 8] (10/07/19 10:55:00)       UA Specific Gravity: 1.015 [1.001  - 1.035] (10/07/19 10:55:00)       UA Glucose: NEGATIVE [NEGATIVE  - NEGATIVE] (10/07/19 10:55:00)       UA Bilirubin: NEGATIVE [NEGATIVE  - NEGATIVE] (10/07/19 10:55:00)       UA Ketones: NEGATIVE [NEGATIVE  - NEGATIVE] (10/07/19 10:55:00)       Urine Occult Blood: NEGATIVE [NEGATIVE  - NEGATIVE] (10/07/19 10:55:00)       UA Protein: NEGATIVE  [NEGATIVE  - NEGATIVE] (10/07/19 10:55:00)       UA Nitrite: NEGATIVE [NEGATIVE  - NEGATIVE] (10/07/19 10:55:00)       UA Leukocyte Esterase: NEGATIVE [NEGATIVE  - NEGATIVE] (10/07/19 10:55:00)

## 2022-02-16 NOTE — PROGRESS NOTES
Patient:   MAX RUVALCABA            MRN: [id number]            FIN: [id number]               Age:   61 years     Sex:  Male     :  1955   Associated Diagnoses:   None   Author:   Angel Siu MD      Visit Information      Date of Service: 2017 08:59 am  Performing Location: [facility]  Encounter#: [id number]      Primary Care Provider (PCP):  Jeff Haider MD    NPI# [id number]      Referring Provider:  No referring provider recorded for selected visit.      Chief Complaint   2017 9:04 AM CST    Pt here for toenail infection.        History of Present Illness   Pt presents for bilateral ingrown toenails. The left side is worse. Problem has been going on for 3 weeks. He has tried conservative measures to some extent on the left side without too much success. Basically has just been trying to trim it. He's never had this problem before. 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       Review of Systems         No fevers      Health Status   Allergies:    Allergic Reactions (Selected)  No Known Medication Allergies   Medications:  (Selected)   Prescriptions  Prescribed  ProAir HFA 90 mcg/inh inhalation aerosol: 2 puff(s), inh, qid, Instructions: Hold; pt to request when needed, # 1 EA, 1 Refill(s), Type: Maintenance, Pharmacy: [facility] #[id number], 2 puff(s) inh qid,Instr:Hold; pt to request when needed  heated tubing, filters, nasal mask with cushion replacment: heated tubing, filters, nasal mask with cushion replacment, See Instructions, Instructions: use as directed, Supply, # 1 EA, 0 Refill(s), Type: Maintenance, other reason (Rx)  lisinopril 10 mg oral tablet: 1 tab(s) ( 10 mg ), PO, Daily, # 90 tab(s), 3 Refill(s), Type: Maintenance, Pharmacy: [facility] #[id number], 1 tab(s) po daily  Documented Medications  Documented  Daily Multiple Vitamins: 1 tab(s), po, daily, 0 Refill(s), Type: Maintenance  Prilosec OTC 20 mg oral enteric coated tablet: 1  tab(s) ( 20 mg ), po, daily, 0 Refill(s), Type: Maintenance   Problem list:    All Problems (Selected)  Chronic heartburn / ICD-9-.1 / Confirmed  Osteoarthritis of right shoulder / SNOMED CT 610624054 / Confirmed  Obesity / ICD-9-.00 / Probable  ANNA on CPAP / ICD-9-.23 / Confirmed  Osteoarthritis of Knee / ICD-9-.96 / Confirmed      Histories   Past Medical History:    Active  Chronic heartburn (787.1)  Osteoarthritis of right shoulder ([id number])  Comments:  7/1/2015 CDT 10:06 AM CDT - Emely Zepeda  With an associated large rotator cuff tear.  Resolved  Rotator cuff tear (9240547977): Onset in the month of 12/2015 at 60 years  Resolved.   Family History:    Sleep apnea syndrome  Mother  Hypertension  Father  Mother  Heart disease  Mother  Father  Myocardial infarction  Mother  Hypercholesterolemia  Father     Procedure history:    Left rotator cuff repair on 12/15/2015 at 60 Years.  Revision total joint arthroplasty, left knee on 11/17/2011 at 56 Years.  Arthroscopy of knee ([id number]) on 3/8/2010 at 54 Years.  Comments:  11/29/2011 12:24 PM - Angelica Larsen  Left knee surgery/Dr. Abdi, [place], [place].  Colonoscopy ([id number]) in the month of 7/2009 at 54 Years.  Replacement of left knee joint ([id number]) on 1/8/2008 at 52 Years.  Spermatocelectomy ([id number]) on 9/17/2007 at 52 Years.  Comments:  11/29/2011 12:27 PM - Angelica Larsen Dr.   Social History:        Alcohol Assessment: Current            Current      Tobacco Assessment            Never      Substance Abuse Assessment            Never      Employment and Education Assessment            Work/School description: .      Home and Environment Assessment            Marital status:  (Living together).  Spouse/Partner name: Darlene.      Exercise and Physical Activity Assessment            Exercise type: Bicycling, back-strengthening exercises.            Exercise type: Exercise with farming..         Physical Examination   Vital Signs   2/17/2017 9:04 AM CST Temperature Tympanic 97.1 DegF  LOW    Peripheral Pulse Rate 64 bpm    Systolic Blood Pressure 140 mmHg    Diastolic Blood Pressure 78 mmHg    Mean Arterial Pressure 99 mmHg      Measurements from flowsheet : Measurements   2/17/2017 9:04 AM CST Height Measured - Standard 69.25 in    Weight Measured - Standard 273 lb    BSA 2.46 m2    Body Mass Index 40.02 kg/m2      Toes - R great toe mild ingrown toenail. L great toe red, swollen toe with paronychia.       Impression and Plan   R great toe - ingrown nail, recommended conservative therapy, warm soaks, trying to wedge nail up  L great toe - ingrown nail, recommended removal. Informed consent obtained. Ring block performed with 2% lidocaine w/o epinephrine, using 5 cc of lidocaine. Area was cleansed with iodine. lateral 1/4 of nail was removed w nail splitter and heomstat. Pt tolerated well. He will dress with antibiotic ointment until oozing has stopped. Discussed how to massage skin back to hopefully prevent recurrence. I believe I removed the purulence and paronychia that was present but if he has continued pain he will return.

## 2022-02-16 NOTE — NURSING NOTE
Comprehensive Intake Entered On:  9/30/2020 9:14 AM CDT    Performed On:  9/30/2020 9:03 AM CDT by Hung BRAXTON, Angelica               Summary   Chief Complaint :   DOT px   Weight Measured :   274.8 lb(Converted to: 274 lb 13 oz, 124.65 kg)    Height Measured :   69.25 in(Converted to: 5 ft 9 in, 175.89 cm)    Body Mass Index :   40.28 kg/m2 (HI)    Body Surface Area :   2.47 m2   Systolic Blood Pressure :   128 mmHg   Diastolic Blood Pressure :   82 mmHg (HI)    Mean Arterial Pressure :   97 mmHg   Peripheral Pulse Rate :   63 bpm   BP Site :   Left arm   Pulse Site :   Radial artery   BP Method :   Manual   HR Method :   Manual   Temperature Tympanic :   97.6 DegF(Converted to: 36.4 DegC)  (LOW)    Oxygen Saturation :   95 %   Angelica Persaud MA - 9/30/2020 9:03 AM CDT   Health Status   Allergies Verified? :   Yes   Medication History Verified? :   Yes   Medical History Verified? :   Yes   Pre-Visit Planning Status :   Completed   Tobacco Use? :   Never smoker   Angelica Persaud MA - 9/30/2020 9:03 AM CDT   Consents   Consent for Immunization Exchange :   Consent Granted   Consent for Immunizations to Providers :   Consent Granted   Angelica Persaud MA - 9/30/2020 9:03 AM CDT   Meds / Allergies   (As Of: 9/30/2020 9:14:23 AM CDT)   Allergies (Active)   ibuprofen  Estimated Onset Date:   Unspecified ; Reactions:   GI upset ; Created By:   Cheri Harris CMA; Reaction Status:   Active ; Category:   Drug ; Substance:   ibuprofen ; Type:   Intolerance ; Updated By:   Cheri Harris CMA; Reviewed Date:   8/11/2020 1:06 PM CDT        Medication List   (As Of: 9/30/2020 9:14:23 AM CDT)   Prescription/Discharge Order    lisinopril  :   lisinopril ; Status:   Prescribed ; Ordered As Mnemonic:   lisinopril 10 mg oral tablet ; Simple Display Line:   1 tab(s), Oral, daily, 90 tab(s), 3 Refill(s) ; Ordering Provider:   Jeff Haider MD; Catalog Code:   lisinopril ; Order Dt/Tm:   11/18/2019 11:49:28 AM CST           Miscellaneous Rx Supply  :   Miscellaneous Rx Supply ; Status:   Prescribed ; Ordered As Mnemonic:   heated tubing, filters, nasal mask with cushion replacment ; Simple Display Line:   See Instructions, use as directed, 1 EA, 0 Refill(s) ; Ordering Provider:   Jeff Haider MD; Catalog Code:   Miscellaneous Rx Supply ; Order Dt/Tm:   10/11/2016 5:53:33 PM CDT ; Comment:   Months = 99 (Lifetime)          omeprazole  :   omeprazole ; Status:   Prescribed ; Ordered As Mnemonic:   omeprazole 20 mg oral delayed release capsule ; Simple Display Line:   1 cap(s), Oral, daily, 90 cap(s), 3 Refill(s) ; Ordering Provider:   Jeff Haider MD; Catalog Code:   omeprazole ; Order Dt/Tm:   11/18/2019 11:49:35 AM CST          sildenafil  :   sildenafil ; Status:   Prescribed ; Ordered As Mnemonic:   sildenafil 100 mg oral tablet ; Simple Display Line:   100 mg, 1 tab(s), Oral, daily, 30 tab(s), 2 Refill(s) ; Ordering Provider:   Jeff Haider MD; Catalog Code:   sildenafil ; Order Dt/Tm:   11/18/2019 11:49:27 AM CST            Home Meds    carbonyl iron  :   carbonyl iron ; Status:   Documented ; Ordered As Mnemonic:   Iron Chews ; Simple Display Line:   65 mg, Oral, daily, 0 Refill(s) ; Catalog Code:   carbonyl iron ; Order Dt/Tm:   9/30/2020 9:11:28 AM CDT          multivitamin  :   multivitamin ; Status:   Documented ; Ordered As Mnemonic:   Daily Multiple Vitamins ; Simple Display Line:   1 tab(s), po, daily, 0 Refill(s) ; Catalog Code:   multivitamin ; Order Dt/Tm:   10/3/2016 12:37:06 PM CDT            Vision Testing POC   Corrective Lenses :   None   Color Blind Correct Plates :   normal   Eye, Left Visual Acuity :   20/25   Eye, Right Visual Acuity :   20/25   Eye, Bilateral Visual Acuity :   20/20   Hung BRAXTON, Angelica - 9/30/2020 9:03 AM CDT   ID Risk Screen   Recent Travel History :   No recent travel   Family Member Travel History :   No recent travel   Other Exposure to Infectious Disease :   Unknown    Hung BRAXTON, Angelica - 9/30/2020 9:03 AM CDT   Social History   Social History   (As Of: 9/30/2020 9:14:23 AM CDT)   Alcohol:  Current      Current   (Last Updated: 11/29/2011 12:20:21 PM CST by Angelica Larsen)          Tobacco:        Never   (Last Updated: 11/29/2011 12:20:28 PM CST by Angelica Larsen)          Substance Abuse:        Never   (Last Updated: 11/29/2011 12:20:37 PM CST by Angelica Larsen)          Employment/School:        Work/School description: .   (Last Updated: 11/29/2011 12:29:46 PM CST by Angelica Larsen)          Home/Environment:        Marital status:  (Living together).  Spouse/Partner name: Darlene.   (Last Updated: 11/29/2011 12:30:55 PM CST by Angelica Larsen)          Exercise:        Exercise type: Exercise with farming..   (Last Updated: 11/29/2011 12:26:33 PM CST by Angelica Larsen)   Exercise type: Bicycling, back-strengthening exercises.   (Last Updated: 11/29/2011 12:37:24 PM CST by Angelica Larsen)

## 2022-02-16 NOTE — LETTER
(Inserted Image. Unable to display)   October 09, 2019        MAX RUVALCABA   [address] [place], [place] [id number]        Dear MAX,      Thank you for selecting [facility] ([facility]viously [facility]) for your healthcare needs.    Our records indicate you are due for the following services:    Hypertension check ~ please remember to bring your at-home blood pressure readings with you to your appointment.   Non-Fasting Labs    If you had your labs done at another facility or with Direct Access Lab Testing at [facility], please bring in a copy of the results to your next visit, mail a copy, or drop off a copy of your results to your Healthcare Provider.    You are due for lab work and an office visit, please schedule the lab appointment 1 week before the office visit.  This will assure all results are available to discuss with your provider during your visit.    **It is very helpful if you bring your medication bottles to your appointment.  This assures we have all of your current medications, including strength and dosing information, documented accurately in your medical record.    To schedule an appointment or if you have further questions, please contact your primary clinic:   [facility]       [phone]   [facility]       [phone]              [facility]     [phone]      Powered by [employer]    Sincerely,    Jeff Haider M.D.

## 2022-02-16 NOTE — NURSING NOTE
Urine Dipstick POC Entered On:  9/28/2021 10:39 AM CDT    Performed On:  9/21/2021 10:38 AM CDT by Earnesitne Suero               Urine Dipstick POC   Urine Color Urine Dipstick :   Yellow   Urine Appearance Urine Dipstick :   Clear   Glucose Urine Dipstick :   Negative   Specific Gravity Urine Dipstick :   1.020   Blood Urine Dipstick :   Negative   pH Urine Dipstick :   7   Protein Urine Dipstick :   Negative   Bilirubin Urine Dipstick :   Negative   Urobilinogen Urine Dipstick :   0.2 mg/dl   Nitrite Urine Dipstick :   Negative   Leukocytes Urine Dipstick :   Negative   Ketones Urine Dipstick :   Negative   POC Test Comments :   Performed at [facility]   Earnestine Suero - 9/28/2021 10:38 AM CDT

## 2022-02-16 NOTE — TELEPHONE ENCOUNTER
---------------------  From: Jeff Haider MD   To: MAX RUVALCABA    Sent: 10/2/2020 8:55:19 AM CDT  Subject: General Message     Your cholesterol tests look good and are similar to previous tests.  The blood counts are improving and you can stop iron supplements.      Results:  Date Result Name Ind Value Ref Range   9/30/2020 10:01 AM Cholesterol  188 mg/dL ( - <200)   9/30/2020 10:01 AM Non-HDL Cholesterol ((H)) 150 ( - <130)   9/30/2020 10:01 AM HDL ((L)) 38 mg/dL (> OR = 40 - )   9/30/2020 10:01 AM Cholesterol/HDL Ratio  4.9 ( - <5.0)   9/30/2020 10:01 AM LDL ((H)) 128    9/30/2020 10:01 AM Triglyceride  116 mg/dL ( - <150)   9/30/2020 10:01 AM WBC  6.9 (3.8 - 10.8)   9/30/2020 10:01 AM RBC  4.98 (4.20 - 5.80)   9/30/2020 10:01 AM Hgb  13.3 gm/dL (13.2 - 17.1)   9/30/2020 10:01 AM Hct  40.0 % (38.5 - 50.0)   9/30/2020 10:01 AM MCV  80.3 fL (80.0 - 100.0)   9/30/2020 10:01 AM MCH ((L)) 26.7 pg (27.0 - 33.0)   9/30/2020 10:01 AM MCHC  33.3 gm/dL (32.0 - 36.0)   9/30/2020 10:01 AM RDW ((H)) 16.9 % (11.0 - 15.0)   9/30/2020 10:01 AM Platelet  325 (140 - 400)   9/30/2020 10:01 AM MPV  9.2 fL (7.5 - 12.5)

## 2022-02-16 NOTE — PROGRESS NOTES
Patient:   MAX RUVALCABA            MRN: [id number]            FIN: [id number]               Age:   65 years     Sex:  Male     :  1955   Associated Diagnoses:   Encounter for examination required by Department of Transportation (DOT)   Author:   Vitaly REID, Jeff      Results Review   General results   Today's results   2020 9:37 AM CDT influenza virus vaccine, inactivated 0.5 mL mL   2020 9:06 AM CDT UA pH 7.0    UA Specific Gravity 1.020    UA Glucose NEGATIVE    UA Bilirubin NEGATIVE    UA Ketones NEGATIVE    U Occult Blood NEGATIVE    UA Protein NEGATIVE    UA Nitrite NEGATIVE    UA Leuk Est NEGATIVE    Lab Report [facility] # [id number]   2020 9:03 AM CDT Height Measured - Standard 69.25 in    Weight Measured - Standard 274.8 lb    BSA 2.47 m2    Body Mass Index 40.28 kg/m2  HI    Temperature Tympanic 97.6 DegF  LOW    Peripheral Pulse Rate 63 bpm    Pulse Site Radial artery    HR Method Manual    Systolic Blood Pressure 128 mmHg    Diastolic Blood Pressure 82 mmHg  HI    Mean Arterial Pressure 97 mmHg    BP Site Left arm    BP Method Manual    Oxygen Saturation 95 %    Allergies Verified? Yes    Medication History Verified? Yes    Medical History Verified? Yes    Tobacco Use? Never smoker    Pre-Visit Planning Status Completed    Corrective Lenses None    Color Blind Correct Plates normal    Eye, Right Visual Acuity 20/25    Eye, Left Visual Acuity 20/25    Eye, Bilateral Visual Acuity 20/20    Recent Travel History No recent travel    Family Member Travel History No recent travel    Other Exposure to Infectious Disease Unknown    Chief Complaint DOT px    Consent for Immunization Exchange Consent Granted    Consent for Immunizations to Providers Consent Granted    Comprehensive Intake Form Comprehensive Intake Form    Intake Form Comprehensive Intake   2020 12:00 AM CDT Sleep Progress Note [facility] - Compliance Report - 20 to 20         Health Status    Allergies:    Nonallergic Reactions (Selected)  Severity Not Documented  Ibuprofen (Gi upset)   Medications:  (Selected)   Prescriptions  Prescribed  heated tubing, filters, nasal mask with cushion replacment: heated tubing, filters, nasal mask with cushion replacment, See Instructions, Instructions: use as directed, Supply, # 1 EA, 0 Refill(s), Type: Maintenance, other reason (Rx)  lisinopril 10 mg oral tablet: = 1 tab(s), Oral, daily, # 90 tab(s), 3 Refill(s), Type: Maintenance, Pharmacy: [facility] #[id number], Needs appt for further refills, 1 tab(s) Oral daily  omeprazole 20 mg oral delayed release capsule: = 1 cap(s), Oral, daily, # 90 cap(s), 3 Refill(s), Type: Maintenance, Pharmacy: [facility] #[id number], Needs appt for further refills, 1 cap(s) Oral daily  sildenafil 100 mg oral tablet: = 1 tab(s) ( 100 mg ), Oral, daily, # 30 tab(s), 2 Refill(s), Type: Maintenance, Pharmacy: [facility] #[id number], 1 tab(s) Oral daily  Documented Medications  Documented  Daily Multiple Vitamins: 1 tab(s), po, daily, 0 Refill(s), Type: Maintenance  Iron Chews: ( 65 mg ), Oral, daily, 0 Refill(s), Type: Maintenance   Problem list:    All Problems  Osteoarthritis of right shoulder / SNOMED CT 457108710 / Confirmed  Hypertension / SNOMED CT 8579319511 / Confirmed  Obesity / ICD-9-.00 / Probable  ANNA on CPAP / ICD-9-.23 / Confirmed  Osteoarthritis of Knee / ICD-9-.96 / Confirmed  Chronic heartburn / ICD-9-.1 / Confirmed  Resolved: Rotator cuff tear / SNOMED CT 7795450939      Chief Complaint   9/30/2020 9:03 AM CDT    DOT px        Subjective   Problem:  Please see scanned in copy of DOT physical      Objective   Vital Signs   9/30/2020 9:03 AM CDT Temperature Tympanic 97.6 DegF  LOW    Peripheral Pulse Rate 63 bpm    Pulse Site Radial artery    HR Method Manual    Systolic Blood Pressure 128 mmHg    Diastolic Blood Pressure 82 mmHg  HI    Mean Arterial Pressure 97 mmHg    BP Site Left  arm    BP Method Manual    Oxygen Saturation 95 %      Measurements from flowsheet : Measurements   9/30/2020 9:03 AM CDT Height Measured - Standard 69.25 in    Weight Measured - Standard 274.8 lb    BSA 2.47 m2    Body Mass Index 40.28 kg/m2  HI      General:  Alert and oriented, No acute distress.    Eye:  Extraocular movements are intact, Normal conjunctiva.         Visual fields: Full to confrontation both eyes.    HENT:  Normocephalic, Tympanic membranes are clear, Normal hearing, Oral mucosa is moist, No pharyngeal erythema, No sinus tenderness.    Neck:  Supple, Non-tender, No lymphadenopathy.    Respiratory:  Lungs are clear to auscultation, Respirations are non-labored.    Cardiovascular:  Normal rate, Regular rhythm.    Gastrointestinal:  Soft, Non-tender, No organomegaly.    Musculoskeletal:  Normal range of motion, Normal strength, No tenderness.    Integumentary:  Warm, Dry.    Neurologic:  Alert, Oriented.    Psychiatric:  Cooperative, Appropriate mood & affect.       Plan   Impression and Plan:  Orders   .    Diagnosis     Encounter for examination required by Department of Transportation (DOT) (OMF12-JE Z02.89).     .    Condition stable, and certification approved for one year.

## 2022-02-16 NOTE — LETTER
(Inserted Image. Unable to display)   July 09, 2019      MAX RUVALCABA   [address] [place] [id number]        Dear MAX,      Thank you for selecting [facility] for your healthcare needs.       You have not scheduled your colonoscopy.  Of cancers affecting both men and women, colorectal cancer (cancer of the colon and rectum) is the second leading cancer killer in the [place].  Screening can find precancerous polyps abnormal growths in the colon or rectum so they can be removed before turning into cancer. Screening also helps find colorectal cancer at an early stage, when treatment often leads to a cure.  I highly recommend colon cancer screening as a way of detecting and treating polyps which can develop into cancer.  Please contact me or my assistant to help you schedule the procedure.          Please contact me or my assistant at [phone] if you have any questions or concerns.     Sincerely,        Jeff Haider MD

## 2022-02-16 NOTE — TELEPHONE ENCOUNTER
Entered by Angelica Persaud MA on November 30, 2020 12:04:56 PM CST  ---------------------  From: Angelica Persaud MA   To: [facility]    Sent: 11/30/2020 12:04:56 PM CST  Subject: Medication Management     ** Submitted: **  Order:omeprazole (omeprazole 20 mg oral delayed release capsule)  1 cap(s)  Oral  daily  Qty:  30 cap(s)        Refills:  0          Substitutions Allowed     Route To [facility]    Signed by Angelica Persaud MA  11/30/2020 6:04:00 PM UTC    ** Submitted: **  Complete:omeprazole (omeprazole 20 mg oral delayed release capsule)   Signed by Angelica Persaud MA  11/30/2020 6:04:00 PM UT    ** Not Approved:  **  omeprazole (OMEPRAZOLE 20MG CPDR)  TAKE ONE CAPSULE BY MOUTH EVERY DAY  Qty:  90 unknown unit        Days Supply:  90        Refills:  2          Substitutions Allowed     Route To [facility]   Signed by Angelica Persaud MA            ** Submitted: **  Order:lisinopril (lisinopril 10 mg oral tablet)  1 tab(s)  Oral  daily  Qty:  30 tab(s)        Refills:  0          Substitutions Allowed     Route To [facility]    Signed by Angelica Persaud MA  11/30/2020 6:03:00 PM UTC    ** Submitted: **  Complete:lisinopril (lisinopril 10 mg oral tablet)   Signed by Angelica Persaud MA  11/30/2020 6:04:00 PM UTC    ** Not Approved:  **  lisinopril (LISINOPRIL 10MG TABS)  TAKE ONE TABLET BY MOUTH EVERY DAY  Qty:  90 unknown unit        Days Supply:  90        Refills:  2          Substitutions Allowed     Route To [facility]   Signed by Angelica Persaud MA            ** Patient matched by Angelica Persaud MA on 11/30/2020 12:02:24 PM CST **      ------------------------------------------  From: [facility]  To: Jeff Haider MD  Sent: November 29, 2020 11:39:54 AM CST  Subject: Medication Management  Due: November 26,  2020 4:59:30 PM CST     ** On Hold Pending Signature **     Drug: lisinopril (lisinopril 10 mg oral tablet), TAKE ONE TABLET BY MOUTH EVERY DAY  Quantity: 90 unknown unit  Days Supply: 90  Refills: 2  Substitutions Allowed  Notes from Pharmacy:     Dispensed Drug: lisinopril (lisinopril 10 mg oral tablet), TAKE ONE TABLET BY MOUTH EVERY DAY  Quantity: 90 unknown unit  Days Supply: 90  Refills: 2  Substitutions Allowed  Notes from Pharmacy:     ** On Hold Pending Signature **     Drug: omeprazole (omeprazole 20 mg oral delayed release capsule), TAKE ONE CAPSULE BY MOUTH EVERY DAY  Quantity: 90 unknown unit  Days Supply: 90  Refills: 2  Substitutions Allowed  Notes from Pharmacy:     Dispensed Drug: omeprazole (omeprazole 20 mg oral delayed release capsule), TAKE ONE CAPSULE BY MOUTH EVERY DAY  Quantity: 90 unknown unit  Days Supply: 90  Refills: 2  Substitutions Allowed  Notes from Pharmacy:  ------------------------------------------LV:  9/30/2020 w/ GTG for DOT px; reminder letters sent to pt for HTN f/u, message sent to pharmacy.

## 2022-02-16 NOTE — PROGRESS NOTES
Chief Complaint    c/o ongoing cough x 6 months with phlegm, chest discomfort  on left side x 6 months verbal consent given for video visit   Visit type:  Video visit via [employer] or [employer]   Participants in room during visit:  patient   Location of patient:  home   Location of physician:  office   Video start time:  1312   Video end time:  1323   Today's visit was conducted by video conference due to the COVID-19 pandemic.  The patient's consent to proceed with the video conference was obtained and documented.  History of Present Illness      66 yo male with complaints of productive cough, chest tightness for the past 6 months.  Energy level is OK, some CRISOSTOMO, no hemoptysis,  Using CPAP consistently, sleeps well      No cough during the night      Possible exposure to CO this winter      Had episode of bronchitis in January  Review of Systems          ROS reviewed and negative except for symptoms noted in HPI.  Physical Exam      appears well  Assessment/Plan       1. Cough with sputum (R05)         chronic intermittently productive of yellow sputum with mild CRISOSTOMO        CXR, BMP, CBC and follow up when results are available  Patient Information     Name:MAX RUVALCABA      Address:      [address] [id number]     Sex:Male     YOB: 1955     [phone]     Emergency Contact:DAVID RUVALCABA     MRN:[id number]     FIN:[id number]     Location:[facility]     Date of Service:08/11/2020      Primary Care Physician:       Jfef Haider MD, [phone]      Attending Physician:       Jeff Haider MD, [phone]  Problem List/Past Medical History    Ongoing     Chronic heartburn     Hypertension     Obesity     ANNA on CPAP       Comments: Moderate ANNA with AHI 19 CPAP 9     Osteoarthritis of Knee     Osteoarthritis of right shoulder       Comments: With an associated large rotator cuff tear.    Historical     Rotator cuff  tear  Procedure/Surgical History     Colonoscopy (09/10/2019)      Comments: Sedation: conscious      Polyps: none      Diverticuli: no      Follow up: 10 years (Sept 2029).     Left rotator cuff repair (12/15/2015)     Revision total joint arthroplasty, left knee (11/17/2011)     Arthroscopy of knee (03/08/2010)      Comments: Left knee surgery/[place], [place]..     Colonoscopy (07/15/2009)      Comments: Indication:  screening      Sedation:  Midazolam, Fentanyl--IV      Findings:  no polyps seen      Recommendation:  f/u 10yrs.     Replacement of left knee joint (01/08/2008)     Spermatocelectomy (09/17/2007)      Comments: Dr. Gallardo.  Medications    Daily Multiple Vitamins, 1 tab(s), Oral, daily    heated tubing, filters, nasal mask with cushion replacment, See Instructions    lisinopril 10 mg oral tablet, 1 tab(s), Oral, daily, 3 refills    omeprazole 20 mg oral delayed release capsule, 1 cap(s), Oral, daily, 3 refills    sildenafil 100 mg oral tablet, 100 mg= 1 tab(s), Oral, daily, 2 refills  Allergies    ibuprofen (GI upset)  Social History    Smoking Status     Never smoker     Alcohol - Current      Current     Employment/School      Work/School description: .     Exercise      Exercise type: Bicycling, back-strengthening exercises.     Home/Environment      Marital status:  (Living together). Spouse/Partner name: Darlene.     Substance Abuse      Never     Tobacco      Never  Family History    Heart disease: Mother and Father.    Hypercholesterolemia: Father.    Hypertension: Mother and Father.    Myocardial infarction: Mother.    Sleep apnea syndrome: Mother.  Immunizations      Vaccine Date Status          influenza virus vaccine, inactivated 11/18/2019 Given          influenza virus vaccine, inactivated 10/08/2018 Given          tetanus/diphth/pertuss (Tdap) adult/adol 10/03/2016 Given          influenza virus vaccine, inactivated 10/03/2016 Given          influenza virus  vaccine, inactivated 11/20/2014 Given          influenza 11/19/2011 Recorded          Td 01/25/2006 Recorded          Td 02/24/2004 Recorded          Td 03/22/1995 Recorded

## 2022-02-16 NOTE — TELEPHONE ENCOUNTER
Entered by Lizzy Reyna CMA on October 02, 2020 11:53:53 AM CDT  [place],    Your A1C level was not check at your recent visit.  Let us know if you have further questions,    Lizzy MAI CMA      ---------------------  From: MAX RUVALCABA  To: [facility]  Sent: 10/02/2020 11:33 a.m. CDT  Subject: Blood test from September 30 2020  What was my A1C level on blood test? Wondering if it was checked.Sent via portal

## 2022-02-16 NOTE — PROGRESS NOTES
Chief Complaint  c/o having issues w/ possible ingrown toenails to both great toes.  History of Present Illness  Patient is here with painful great toes. ?He is concerned about ingrown toenails. ?He had a partial toenail removal in February but this has not improved his situation. ?He has chronic paronychia present in the toes are quite painful.  Review of Systems  Negative except for above  Problem List/Past Medical History  Ongoing  Chronic heartburn  Obesity  ANNA on CPAP  Osteoarthritis of Knee  Osteoarthritis of right shoulder  Resolved  Rotator cuff tear  Procedure/Surgical History  Left rotator cuff repair (12/15/2015),  Revision total joint arthroplasty, left knee (11/17/2011),  Arthroscopy of knee (03/08/2010),  Colonoscopy (07/15/2009),  Replacement of left knee joint (01/08/2008),  Spermatocelectomy (09/17/2007).  Home Medications  cephalexin 500 mg oral capsule, 500 mg, 1 cap(s), po, tid  Daily Multiple Vitamins, 1 tab(s), po, daily  heated tubing, filters, nasal mask with cushion replacment  lisinopril 10 mg oral tablet, 10 mg, 1 tab(s), po, daily, 3 refills  Prilosec OTC 20 mg oral enteric coated tablet, 20 mg, 1 tab(s), po, daily  ProAir HFA 90 mcg/inh inhalation aerosol, 2 puff(s), inh, qid, 1 refills  Allergies  No Known Medication Allergies  Social History  Alcohol  Current  Employment and Education  Work/School description: .  Exercise and Physical Activity  Exercise type: Bicycling, back-strengthening exercises.  Substance Abuse  Never  Tobacco  Never  Family History  Heart disease: Mother  and Father.  Hypercholesterolemia: Father.  Hypertension: Mother  and Father.  Myocardial infarction: Mother.  Sleep apnea syndrome: Mother.  Physical Exam  Vitals & Measurements  T:?98.5?(Tympanic)?  HR:?68?(Peripheral)?  BP:?130/90?  HT:?69.25?in?  WT:?270?lb?  BMI:?39.58?  Alert, oriented, no acute distress  Right great toenail is trimmed very short. ?There is paronychial inflammation around the  entire nail. ?It is very tender to palpation.  Left toenail is similar with evidence of previous partial removal.  Assessment/Plan  Ingrowing great toenail  We will treat the paronychial inflammation with cephalexin and he will return in a week or so for toenail removal.

## 2022-02-16 NOTE — TELEPHONE ENCOUNTER
---------------------  From: Jeff Berumen MD   To: MAX RUVALCABA    Cc: Jeff Haider MD;      Sent: 9/10/2019 11:14:10 AM CDT  Subject: Colonoscopy     Dear Max    You had a colonoscopy done for colon cancer screening on Tuesday September 10th, 2019. It was normal. I would recommend a follow up colonoscopy in 10 years and sooner if new symptoms develop.      Jose Berumen MD

## 2022-02-16 NOTE — TELEPHONE ENCOUNTER
---------------------  From: Jeff Haider MD   To: MAX RUVALCABA    Sent: 11/20/2019 12:35:25 PM CST  Subject: Patient Message - Results Notification        Your cholesterol numbers are a bit elevated.  With the low good cholesterol (HDL) and elevated bad cholesterol (LDL) you should consider starting a cholesterol lowering medication to reduce your risk of heart disease.    Results:  Date Result Name Ind Value Ref Range   11/18/2019 12:01 PM Sodium Level  139 mmol/L (135 - 146)   11/18/2019 12:01 PM Potassium Level  4.5 mmol/L (3.5 - 5.3)   11/18/2019 12:01 PM Chloride Level  102 mmol/L (98 - 110)   11/18/2019 12:01 PM CO2 Level  28 mmol/L (20 - 32)   11/18/2019 12:01 PM Glucose Level ((H)) 101 mg/dL (65 - 99)   11/18/2019 12:01 PM BUN  19 mg/dL (7 - 25)   11/18/2019 12:01 PM Creatinine Level  1.08 mg/dL (0.70 - 1.25)   11/18/2019 12:01 PM eGFR  73 mL/min/1.73m2 (> OR = 60 - )   11/18/2019 12:01 PM Calcium Level  10.0 mg/dL (8.6 - 10.3)   11/18/2019 12:01 PM Hgb A1c ((H)) 5.7 ( - <5.7)   11/18/2019 12:01 PM Cholesterol ((H)) 205 mg/dL ( - <200)   11/18/2019 12:01 PM Non-HDL Cholesterol ((H)) 167 ( - <130)   11/18/2019 12:01 PM HDL ((L)) 38 mg/dL (>40 - )   11/18/2019 12:01 PM Cholesterol/HDL Ratio ((H)) 5.4 ( - <5.0)   11/18/2019 12:01 PM LDL ((H)) 138    11/18/2019 12:01 PM Triglyceride ((H)) 156 mg/dL ( - <150)

## 2022-02-16 NOTE — PROGRESS NOTES
Patient:   MAX RUVALCABA            MRN: [id number]            FIN: [id number]               Age:   64 years     Sex:  Male     :  1955   Associated Diagnoses:   Encounter for examination required by Department of Transportation (DOT)   Author:   Vitaly REID, Jeff      Results Review   General results   Today's results   10/7/2019 11:33 AM CDT Height Measured - Standard 69.25 in    Weight Measured - Standard 275 lb    BSA 2.47 m2    Body Mass Index 40.31 kg/m2  HI    Temperature Tympanic 98.1 DegF    Peripheral Pulse Rate 70 bpm    Pulse Site Radial artery    HR Method Manual    Systolic Blood Pressure 126 mmHg    Diastolic Blood Pressure 76 mmHg    Mean Arterial Pressure 93 mmHg    BP Site Right arm    BP Method Manual    Allergies Verified? Yes    Medication History Verified? Yes    Pre-Visit Planning Status Completed    Corrective Lenses None    Eye, Right Visual Acuity 20/25    Eye, Left Visual Acuity 20/20    Eye, Bilateral Visual Acuity 20/20    Chief Complaint DOT Px, Sleep Machine chip.    Consent for Immunization Exchange Consent Granted    Consent for Immunizations to Providers Consent Granted    Comprehensive Intake Form Comprehensive Intake Form    Intake Form Comprehensive Intake   10/7/2019 10:55 AM CDT UA pH 6.0    UA Specific Gravity 1.015    UA Glucose NEGATIVE    UA Bilirubin NEGATIVE    UA Ketones NEGATIVE    U Occult Blood NEGATIVE    UA Protein NEGATIVE    UA Nitrite NEGATIVE    UA Leuk Est NEGATIVE    Lab Report [facility] # [id number]         Health Status   Allergies:    Nonallergic Reactions (Selected)  Severity Not Documented  Ibuprofen (Gi upset)   Medications:  (Selected)   Prescriptions  Prescribed  heated tubing, filters, nasal mask with cushion replacment: heated tubing, filters, nasal mask with cushion replacment, See Instructions, Instructions: use as directed, Supply, # 1 EA, 0 Refill(s), Type: Maintenance, other reason (Rx)  lisinopril 10 mg oral tablet: = 1 tab(s) ( 10  mg ), Oral, daily, # 90 tab(s), 3 Refill(s), Type: Maintenance, Pharmacy: [facility] #[id number], 1 tab(s) Oral daily  omeprazole 20 mg oral delayed release capsule: = 1 cap(s) ( 20 mg ), PO, Daily, # 90 cap(s), 3 Refill(s), Type: Maintenance, Pharmacy: [facility] #[id number], 1 cap(s) Oral daily  sildenafil 50 mg oral tablet: = 1 tab(s) ( 50 mg ), Oral, daily, # 15 tab(s), 0 Refill(s), Type: Maintenance, Pharmacy: [facility] #[id number], 1 tab(s) Oral daily  Documented Medications  Documented  Daily Multiple Vitamins: 1 tab(s), po, daily, 0 Refill(s), Type: Maintenance   Problem list:    All Problems  Osteoarthritis of right shoulder / SNOMED CT 424073703 / Confirmed  Hypertension / SNOMED CT 8057465389 / Confirmed  Obesity / ICD-9-.00 / Probable  ANNA on CPAP / ICD-9-.23 / Confirmed  Osteoarthritis of Knee / ICD-9-.96 / Confirmed  Chronic heartburn / ICD-9-.1 / Confirmed  Resolved: Rotator cuff tear / SNOMED CT 9078732792      Chief Complaint   10/7/2019 11:33 AM CDT   DOT Px, Sleep Machine chip.      Subjective   Problem:  Please see scanned in copy of DOT physical      Objective   Vital Signs   10/7/2019 11:33 AM CDT Temperature Tympanic 98.1 DegF    Peripheral Pulse Rate 70 bpm    Pulse Site Radial artery    HR Method Manual    Systolic Blood Pressure 126 mmHg    Diastolic Blood Pressure 76 mmHg    Mean Arterial Pressure 93 mmHg    BP Site Right arm    BP Method Manual      Measurements from flowsheet : Measurements   10/7/2019 11:33 AM CDT Height Measured - Standard 69.25 in    Weight Measured - Standard 275 lb    BSA 2.47 m2    Body Mass Index 40.31 kg/m2  HI      General:  Alert and oriented, No acute distress.    Eye:  Extraocular movements are intact, Normal conjunctiva.         Visual fields: Full to confrontation both eyes.    HENT:  Normocephalic, Tympanic membranes are clear, Normal hearing, Oral mucosa is moist, No pharyngeal erythema, No sinus tenderness.    Neck:  Supple,  Non-tender, No lymphadenopathy.    Respiratory:  Lungs are clear to auscultation, Respirations are non-labored.    Cardiovascular:  Normal rate, Regular rhythm.    Gastrointestinal:  Soft, Non-tender, No organomegaly.    Musculoskeletal:  Normal range of motion, Normal strength, No tenderness.    Integumentary:  Warm, Dry.    Neurologic:  Alert, Oriented.    Psychiatric:  Cooperative, Appropriate mood & affect.       Plan   Impression and Plan:  Orders   .    Diagnosis     Encounter for examination required by Department of Transportation (DOT) (GYT50-CJ Z02.89).     .    Condition stable, and certification approved for one year.

## 2022-02-16 NOTE — TELEPHONE ENCOUNTER
Entered by Jane Luna CMA on November 04, 2019 7:58:18 AM CST  ---------------------  From: Jane Luna CMA   To: [facility] #[id number]    Sent: 11/4/2019 7:58:18 AM CST  Subject: Medication Management     ** Submitted: **  Order:omeprazole (omeprazole 20 mg oral delayed release capsule)  1 cap(s)  Oral  daily  Qty:  30 cap(s)        Refills:  0          Substitutions Allowed     Route To [facility] #[id number]    Signed by Jane Luna CMA  11/4/2019 7:57:00 AM    ** Submitted: **  Complete:omeprazole (omeprazole 20 mg oral delayed release capsule)   Signed by Jane Luna CMA  11/4/2019 7:58:00 AM    ** Not Approved:  **  omeprazole (OMEPRAZOLE 20MG CAPSULES)  TAKE 1 CAPSULE BY MOUTH ONCE DAILY  Qty:  90 cap(s)        Days Supply:  90        Refills:  0          Substitutions Allowed     Route To [facility] #[id number]   Signed by Jane Luna CMA            ** Submitted: **  Order:lisinopril (lisinopril 10 mg oral tablet)  1 tab(s)  Oral  daily  Qty:  30 tab(s)        Refills:  0          Substitutions Allowed     Route To [facility] #[id number]    Signed by Jane Luna CMA  11/4/2019 7:57:00 AM    ** Submitted: **  Complete:lisinopril (lisinopril 10 mg oral tablet)   Signed by Jane Luna CMA  11/4/2019 7:57:00 AM    ** Not Approved:  **  lisinopril (LISINOPRIL 10MG TABLETS)  TAKE 1 TABLET BY MOUTH ONCE DAILY  Qty:  90 tab(s)        Days Supply:  90        Refills:  0          Substitutions Allowed     Route To [facility] #[id number]   Signed by Jane Luna CMA            ** Patient matched by Jane Luna CMA on 11/4/2019 7:55:06 AM CST **      ------------------------------------------  From: [facility] #[id number]  To: Jeff Haider MD  Sent: November 3, 2019 7:07:12 PM CST  Subject: Medication Management  Due: November 4, 2019 7:07:12 PM CST    ** On Hold Pending Signature **  Drug: lisinopril (lisinopril 10 mg oral tablet)   TAKE 1 TABLET BY MOUTH ONCE DAILY  Quantity: 90 tab(s)  Days Supply: 90  Refills: 0  Substitutions Allowed  Notes from Pharmacy:     Dispensed Drug: lisinopril (lisinopril 10 mg oral tablet)  TAKE 1 TABLET BY MOUTH ONCE DAILY  Quantity: 90 tab(s)  Days Supply: 90  Refills: 0  Substitutions Allowed  Notes from Pharmacy:     ** On Hold Pending Signature **  Drug: omeprazole (omeprazole 20 mg oral delayed release capsule)  TAKE 1 CAPSULE BY MOUTH ONCE DAILY  Quantity: 90 cap(s)  Days Supply: 90  Refills: 0  Substitutions Allowed  Notes from Pharmacy:     Dispensed Drug: omeprazole (omeprazole 20 mg oral delayed release capsule)  TAKE 1 CAPSULE BY MOUTH ONCE DAILY  Quantity: 90 cap(s)  Days Supply: 90  Refills: 0  Substitutions Allowed  Notes from Pharmacy:   ------------------------------------------Med Refill      Date of last office visit and reason:  10/7/19; DOT      Date of last Med Check / Px:   10/2018  Date of last labs pertaining to med:  10/8/18    RTC order in chart:  yes, placed today; due     For Protocol refill, has patient been contacted:  msg sent to pharmacy

## 2022-02-16 NOTE — TELEPHONE ENCOUNTER
---------------------  From: Jeff Haider MD   To: MAX RUVALCABA    Sent: 12/15/2020 6:35:35 PM CST  Subject: Patient Message - Results Notification     Your blood count is back to normal.       Results:  Date Result Name Value Ref Range   12/14/2020 10:41 AM WBC 6.7 (3.8 - 10.8)   12/14/2020 10:41 AM RBC 4.84 (4.20 - 5.80)   12/14/2020 10:41 AM Hgb 13.6 gm/dL (13.2 - 17.1)   12/14/2020 10:41 AM Hct 40.4 % (38.5 - 50.0)   12/14/2020 10:41 AM MCV 83.5 fL (80.0 - 100.0)   12/14/2020 10:41 AM MCH 28.1 pg (27.0 - 33.0)   12/14/2020 10:41 AM MCHC 33.7 gm/dL (32.0 - 36.0)   12/14/2020 10:41 AM RDW 14.6 % (11.0 - 15.0)   12/14/2020 10:41 AM Platelet 304 (140 - 400)   12/14/2020 10:41 AM MPV 9.2 fL (7.5 - 12.5)

## 2022-02-16 NOTE — NURSING NOTE
Comprehensive Intake Entered On:  11/18/2019 11:34 AM CST    Performed On:  11/18/2019 11:30 AM CST by Hung BRAXTON, Angelica               Summary   Chief Complaint :   med check, due for fasting labs.   Weight Measured :   272 lb(Converted to: 272 lb 0 oz, 123.38 kg)    Height Measured :   69.25 in(Converted to: 5 ft 9 in, 175.89 cm)    Body Mass Index :   39.87 kg/m2 (HI)    Body Surface Area :   2.45 m2   Systolic Blood Pressure :   136 mmHg (HI)    Diastolic Blood Pressure :   86 mmHg (HI)    Mean Arterial Pressure :   103 mmHg   Peripheral Pulse Rate :   56 bpm (LOW)    BP Site :   Right arm   Pulse Site :   Radial artery   BP Method :   Manual   HR Method :   Manual   Temperature Tympanic :   96.5 DegF(Converted to: 35.8 DegC)  (LOW)    Angelica Persaud MA - 11/18/2019 11:30 AM CST   Health Status   Allergies Verified? :   Yes   Medication History Verified? :   Yes   Medical History Verified? :   Yes   Pre-Visit Planning Status :   Completed   Tobacco Use? :   Never smoker   Angelica Persaud MA - 11/18/2019 11:30 AM CST   Consents   Consent for Immunization Exchange :   Consent Granted   Consent for Immunizations to Providers :   Consent Granted   Angelica Persaud MA - 11/18/2019 11:30 AM CST   Meds / Allergies   (As Of: 11/18/2019 11:34:37 AM CST)   Allergies (Active)   ibuprofen  Estimated Onset Date:   Unspecified ; Reactions:   GI upset ; Created By:   Cheri Harris CMA; Reaction Status:   Active ; Category:   Drug ; Substance:   ibuprofen ; Type:   Intolerance ; Updated By:   Cheri Harris CMA; Reviewed Date:   10/7/2019 11:37 AM CDT        Medication List   (As Of: 11/18/2019 11:34:37 AM CST)   Prescription/Discharge Order    lisinopril  :   lisinopril ; Status:   Prescribed ; Ordered As Mnemonic:   lisinopril 10 mg oral tablet ; Simple Display Line:   1 tab(s), Oral, daily, 30 tab(s), 0 Refill(s) ; Ordering Provider:   Jeff Haider MD; Catalog Code:   lisinopril ; Order Dt/Tm:   11/4/2019 7:57:36 AM  CST          Miscellaneous Rx Supply  :   Miscellaneous Rx Supply ; Status:   Prescribed ; Ordered As Mnemonic:   heated tubing, filters, nasal mask with cushion replacment ; Simple Display Line:   See Instructions, use as directed, 1 EA, 0 Refill(s) ; Ordering Provider:   Jeff Haider MD; Catalog Code:   Miscellaneous Rx Supply ; Order Dt/Tm:   10/11/2016 5:53:33 PM CDT ; Comment:   Months = 99 (Lifetime)          omeprazole  :   omeprazole ; Status:   Prescribed ; Ordered As Mnemonic:   omeprazole 20 mg oral delayed release capsule ; Simple Display Line:   1 cap(s), Oral, daily, 30 cap(s), 0 Refill(s) ; Ordering Provider:   Jeff Haider MD; Catalog Code:   omeprazole ; Order Dt/Tm:   11/4/2019 7:57:56 AM CST          sildenafil  :   sildenafil ; Status:   Prescribed ; Ordered As Mnemonic:   sildenafil 50 mg oral tablet ; Simple Display Line:   50 mg, 1 tab(s), Oral, daily, 15 tab(s), 0 Refill(s) ; Ordering Provider:   Jeff Haider MD; Catalog Code:   sildenafil ; Order Dt/Tm:   10/8/2018 10:36:11 AM CDT            Home Meds    multivitamin  :   multivitamin ; Status:   Documented ; Ordered As Mnemonic:   Daily Multiple Vitamins ; Simple Display Line:   1 tab(s), po, daily, 0 Refill(s) ; Catalog Code:   multivitamin ; Order Dt/Tm:   10/3/2016 12:37:06 PM CDT            Social History   Social History   (As Of: 11/18/2019 11:34:37 AM CST)   Alcohol:  Current      Current   (Last Updated: 11/29/2011 12:20:21 PM CST by Angelica Larsen)          Tobacco:        Never   (Last Updated: 11/29/2011 12:20:28 PM CST by Angelica Larsen)          Substance Abuse:        Never   (Last Updated: 11/29/2011 12:20:37 PM CST by Angelica Larsen)          Employment/School:        Work/School description: .   (Last Updated: 11/29/2011 12:29:46 PM CST by Angelica Larsen)          Home/Environment:        Marital status:  (Living together).  Spouse/Partner name: Darlene.   (Last Updated: 11/29/2011  12:30:55 PM CST by Angelica Larsen)          Exercise:        Exercise type: Exercise with farming..   (Last Updated: 11/29/2011 12:26:33 PM CST by Angelica Larsen)   Exercise type: Bicycling, back-strengthening exercises.   (Last Updated: 11/29/2011 12:37:24 PM CST by Angelica Larsen)

## 2022-02-16 NOTE — PROGRESS NOTES
Patient:   MAX RUVALCABA            MRN: [id number]            FIN: [id number]               Age:   63 years     Sex:  Male     :  1955   Associated Diagnoses:   Encounter for examination required by Department of Transportation (DOT); Chronic heartburn; Hypertension; ANNA on CPAP   Author:   Jeff Haider MD      Results Review   General results   Today's results   10/8/2018 10:06 AM CDT Height Measured - Standard 69.25 in    Weight Measured - Standard 281.2 lb    BSA 2.49 m2    Body Mass Index 41.22 kg/m2  HI    Temperature Tympanic 98 DegF    Peripheral Pulse Rate 72 bpm    Pulse Site Radial artery    HR Method Manual    Systolic Blood Pressure 136 mmHg  HI    Diastolic Blood Pressure 76 mmHg    Mean Arterial Pressure 96 mmHg    BP Site Right arm    BP Method Manual    Allergies Verified? Yes    Medication History Verified? Yes    Medical History Verified? Yes    Tobacco Use? Never smoker    Pre-Visit Planning Status Completed    Corrective Lenses None    Eye, Right Visual Acuity 20/25    Eye, Left Visual Acuity 20/25    Eye, Bilateral Visual Acuity 20/25    Chief Complaint DOT px.  also needs meds refilled.    Consent for Immunization Exchange Consent Granted    Consent for Immunizations to Providers Consent Granted    Comprehensive Intake Form Comprehensive Intake Form    Intake Form Comprehensive Intake   10/8/2018 9:55 AM CDT UA pH 7.5    UA Specific Gravity 1.015    UA Glucose NEGATIVE    UA Bilirubin NEGATIVE    UA Ketones NEGATIVE    U Occult Blood NEGATIVE    UA Protein NEGATIVE    UA Nitrite NEGATIVE    UA Leuk Est NEGATIVE    Lab Report [facility] # [id number]         Health Status   Allergies:    Allergic Reactions (Selected)  No Known Medication Allergies   Medications:  (Selected)   Prescriptions  Prescribed  heated tubing, filters, nasal mask with cushion replacment: heated tubing, filters, nasal mask with cushion replacment, See Instructions, Instructions: use as directed, Supply, # 1 AURORA  0 Refill(s), Type: Maintenance, other reason (Rx)  lisinopril 10 mg oral tablet: = 1 tab(s) ( 10 mg ), Oral, daily, # 30 tab(s), 0 Refill(s), Type: Maintenance, Pharmacy: [facility] #[id number]  Documented Medications  Documented  Daily Multiple Vitamins: 1 tab(s), po, daily, 0 Refill(s), Type: Maintenance  Prilosec OTC 20 mg oral enteric coated tablet: 1 tab(s) ( 20 mg ), po, daily, 0 Refill(s), Type: Maintenance   Problem list:    All Problems  Osteoarthritis of right shoulder / SNOMED CT 705132799 / Confirmed  Hypertension / SNOMED CT 7482278917 / Confirmed  Obesity / ICD-9-.00 / Probable  ANNA on CPAP / ICD-9-.23 / Confirmed  Osteoarthritis of Knee / ICD-9-.96 / Confirmed  Chronic heartburn / ICD-9-.1 / Confirmed  Resolved: Rotator cuff tear / SNOMED CT 3922669789      Chief Complaint   10/8/2018 10:06 AM CDT   DOT px.  also needs meds refilled.      Subjective   Problem:  Please see scanned in copy of DOT physical      Objective   Vital Signs   10/8/2018 10:06 AM CDT Temperature Tympanic 98 DegF    Peripheral Pulse Rate 72 bpm    Pulse Site Radial artery    HR Method Manual    Systolic Blood Pressure 136 mmHg  HI    Diastolic Blood Pressure 76 mmHg    Mean Arterial Pressure 96 mmHg    BP Site Right arm    BP Method Manual      Measurements from flowsheet : Measurements   10/8/2018 10:06 AM CDT Height Measured - Standard 69.25 in    Weight Measured - Standard 281.2 lb    BSA 2.49 m2    Body Mass Index 41.22 kg/m2  HI      General:  Alert and oriented, No acute distress.    Eye:  Extraocular movements are intact, Normal conjunctiva.         Visual fields: Full to confrontation both eyes.    HENT:  Normocephalic, Tympanic membranes are clear, Normal hearing, Oral mucosa is moist, No pharyngeal erythema, No sinus tenderness.    Neck:  Supple, Non-tender, No lymphadenopathy.    Respiratory:  Lungs are clear to auscultation, Respirations are non-labored.    Cardiovascular:  Normal rate, Regular  rhythm.    Gastrointestinal:  Soft, Non-tender, No organomegaly.    Musculoskeletal:  Normal range of motion, Normal strength, No tenderness.    Integumentary:  Warm, Dry.    Neurologic:  Alert, Oriented.    Psychiatric:  Cooperative, Appropriate mood & affect.       Plan   Impression and Plan:  Orders   .    Diagnosis     Encounter for examination required by Department of Transportation (DOT) (UZV51-PS Z02.89).     .    Condition stable, and certification approved for one year.    Diagnosis     Chronic heartburn (IAA14-IN R12).     Hypertension (JFZ66-AT I10).     ANNA on CPAP (WMV02-TN G47.33).     Orders     Orders (Selected)   Outpatient Orders  Ordered (In Transit)  Basic Metabolic Panel* ([facility]): Specimen Type: Serum, Collection Date: 10/08/18 10:18:00 CDT  Prescriptions  Prescribed  omeprazole 20 mg oral delayed release capsule: = 1 cap(s) ( 20 mg ), PO, Daily, # 90 cap(s), 3 Refill(s), Type: Maintenance, Pharmacy: [facility] #[id number], 1 cap(s) Oral daily  sildenafil 50 mg oral tablet: = 1 tab(s) ( 50 mg ), Oral, daily, # 15 tab(s), 0 Refill(s), Type: Maintenance, Pharmacy: [facility] #[id number], 1 tab(s) Oral daily.

## 2022-02-16 NOTE — LETTER
(Inserted Image. Unable to display)   2019        MAX RUVALCABA   [address] [place], [place] [id number]        Dear MAX,      Thank you for selecting [facility] ([facility]viously [facility] for your healthcare needs.    You previously had a colon cancer screening (colonoscopy or Cologuard) at our facility and our records indicate you are now due for a follow up.  Although there may not be a problem at this time, it is our medical recommendation that you schedule an appointment for an exam with your Healthcare Provider to assess your health history to determine the proper colon cancer screening needed.  This exam can be combined with any scheduled appointment with your primary physician (physicals, medication checks, etc.)    Your Healthcare Provider will then complete the appropriate colon cancer screening order form for either a colonoscopy or Cologuard screening.   For a colonoscopy order, we encourage you to schedule your appointment that day.  Please note that depending on the type of anesthetic you are to receive your order will  in 30 or 90 days from your visit with your Healthcare Provider.  If you do not set up your colonoscopy during this time frame you will need to have another exam as your health history may have changed.    If you are seeing another physician for this problem, we would appreciate knowing so we do not send you another reminder.    To schedule an appointment or if you have further questions, please contact your primary clinic:   [facility]       [phone]   [facility]       [phone]              [facility]     [phone]      Powered by [employer]    Sincerely,    Jeff Haider M.D.

## 2022-02-16 NOTE — LETTER
(Inserted Image. Unable to display)         November 19, 2020        MAXRUSLAN RUVALCABA   [address], [place] [id number]        Dear MAX,    Thank you for selecting [facility] for your healthcare needs.    Our records indicate you are due for the following services:     Hypertension check ~ Please remember to bring your at-home blood pressure readings with you to your appointment.     Non-Fasting Labs    If you had your labs done at another facility or with Direct Access Lab Testing at [facility], please bring in a copy of the results to your next visit, mail a copy, or drop off a copy of your results to your Healthcare Provider.     (FYI   Regarding office visits: In some instances, a video visit or telephone visit may be offered as an option.)    To schedule an appointment or if you have further questions, please contact your clinic at [phone].    Powered by [employer]    Sincerely,    Jeff Haider MD

## 2022-02-16 NOTE — NURSING NOTE
Comprehensive Intake Entered On:  4/5/2021 12:12 PM CDT    Performed On:  4/5/2021 12:05 PM CDT by Margarita De LPN               Summary   Chief Complaint :   follow up heart surgery. 03/25/21   Weight Measured :   273 lb(Converted to: 273 lb 0 oz, 123.831 kg)    Height Measured :   69.25 in(Converted to: 5 ft 9 in, 175.89 cm)    Body Mass Index :   40.02 kg/m2 (HI)    Body Surface Area :   2.46 m2   Systolic Blood Pressure :   132 mmHg (HI)    Diastolic Blood Pressure :   78 mmHg   Mean Arterial Pressure :   96 mmHg   Peripheral Pulse Rate :   72 bpm   BP Site :   Right arm   Pulse Site :   Radial artery   BP Method :   Manual   HR Method :   Manual   Margarita De LPN - 4/5/2021 12:05 PM CDT   Health Status   Allergies Verified? :   Yes   Medication History Verified? :   Yes   Pre-Visit Planning Status :   Completed   Margarita De LPN - 4/5/2021 12:05 PM CDT   Consents   Consent for Immunization Exchange :   Consent Granted   Consent for Immunizations to Providers :   Consent Granted   Margarita De LPN - 4/5/2021 12:05 PM CDT   Meds / Allergies   (As Of: 4/5/2021 12:12:03 PM CDT)   Allergies (Active)   ibuprofen  Estimated Onset Date:   Unspecified ; Reactions:   GI upset ; Created By:   Cheri Harris CMA; Reaction Status:   Active ; Category:   Drug ; Substance:   ibuprofen ; Type:   Intolerance ; Updated By:   Cheri Harris CMA; Reviewed Date:   3/4/2021 11:16 AM CST        Medication List   (As Of: 4/5/2021 12:12:03 PM CDT)   Prescription/Discharge Order    lisinopril  :   lisinopril ; Status:   Completed ; Ordered As Mnemonic:   lisinopril 10 mg oral tablet ; Simple Display Line:   10 mg, 1 tab(s), Oral, daily, for 90 day(s), 90 tab(s), 3 Refill(s) ; Ordering Provider:   Jeff Haider MD; Catalog Code:   lisinopril ; Order Dt/Tm:   12/14/2020 10:34:04 AM CST          Miscellaneous Rx Supply  :   Miscellaneous Rx Supply ; Status:   Prescribed ; Ordered As Mnemonic:   heated  tubing, filters, nasal mask with cushion replacment ; Simple Display Line:   See Instructions, use as directed, 1 EA, 0 Refill(s) ; Ordering Provider:   Jeff Haider MD; Catalog Code:   Miscellaneous Rx Supply ; Order Dt/Tm:   10/11/2016 5:53:33 PM CDT ; Comment:   Months = 99 (Lifetime)          omeprazole  :   omeprazole ; Status:   Prescribed ; Ordered As Mnemonic:   omeprazole 20 mg oral delayed release capsule ; Simple Display Line:   20 mg, 1 cap(s), Oral, bid, for 90 day(s), 180 cap(s), 1 Refill(s) ; Ordering Provider:   Jeff Haider MD; Catalog Code:   omeprazole ; Order Dt/Tm:   12/14/2020 10:34:03 AM CST          sildenafil  :   sildenafil ; Status:   Prescribed ; Ordered As Mnemonic:   sildenafil 100 mg oral tablet ; Simple Display Line:   100 mg, 1 tab(s), Oral, daily, 30 tab(s), 2 Refill(s) ; Ordering Provider:   Jeff Haider MD; Catalog Code:   sildenafil ; Order Dt/Tm:   11/18/2019 11:49:27 AM CST            Home Meds    atorvastatin  :   atorvastatin ; Status:   Documented ; Ordered As Mnemonic:   atorvastatin 80 mg oral tablet ; Simple Display Line:   80 mg, 1 tab(s), Oral, daily, 30 tab(s), 0 Refill(s) ; Catalog Code:   atorvastatin ; Order Dt/Tm:   4/5/2021 12:09:07 PM CDT          carbonyl iron  :   carbonyl iron ; Status:   Completed ; Ordered As Mnemonic:   Iron Chews ; Simple Display Line:   65 mg, Oral, daily, 0 Refill(s) ; Catalog Code:   carbonyl iron ; Order Dt/Tm:   9/30/2020 9:11:28 AM CDT          metoprolol  :   metoprolol ; Status:   Documented ; Ordered As Mnemonic:   metoprolol succinate 25 mg oral tablet, extended release ; Simple Display Line:   25 mg, 1 tab(s), Oral, daily, 30 tab(s), 0 Refill(s) ; Catalog Code:   metoprolol ; Order Dt/Tm:   4/5/2021 12:09:53 PM CDT          multivitamin  :   multivitamin ; Status:   Documented ; Ordered As Mnemonic:   Daily Multiple Vitamins ; Simple Display Line:   1 tab(s), po, daily, 0 Refill(s) ; Catalog Code:    multivitamin ; Order Dt/Tm:   10/3/2016 12:37:06 PM CDT            ID Risk Screen   Recent Travel History :   No recent travel   Family Member Travel History :   No recent travel   Other Exposure to Infectious Disease :   Exposure to respiratory illness of unknown etiology, Unknown   COVID-19 Testing Status :   No positive COVID-19 test   Margarita De LPN - 4/5/2021 12:05 PM CDT

## 2022-02-16 NOTE — TELEPHONE ENCOUNTER
---------------------  From: Jeff Haider MD   To: MAX RUVALCABA    Sent: 9/22/2021 3:40:02 PM CDT  Subject: Patient Message - Results Notification     Your tests look good.      Results:  Date Result Name Ind Value Ref Range   9/21/2021 11:09 AM Glucose Level ((H)) 101 mg/dL (65 - 99)   9/21/2021 11:09 AM BUN  15 mg/dL (7 - 25)   9/21/2021 11:09 AM Creatinine Level  0.88 mg/dL (0.70 - 1.25)   9/21/2021 11:09 AM eGFR  90 mL/min/1.73m2 (> OR = 60 - )   9/21/2021 11:09 AM eGFR African American  104 mL/min/1.73m2 (> OR = 60 - )   9/21/2021 11:09 AM BUN/Creat Ratio  NOT APPLICABLE (6 - 22)   9/21/2021 11:09 AM Sodium Level  138 mmol/L (135 - 146)   9/21/2021 11:09 AM Potassium Level  4.9 mmol/L (3.5 - 5.3)   9/21/2021 11:09 AM Chloride Level  102 mmol/L (98 - 110)   9/21/2021 11:09 AM CO2 Level  29 mmol/L (20 - 32)   9/21/2021 11:09 AM Calcium Level  9.7 mg/dL (8.6 - 10.3)   9/21/2021 11:09 AM Cholesterol  133 mg/dL ( - <200)   9/21/2021 11:09 AM HDL ((L)) 38 mg/dL (> OR = 40 - )   9/21/2021 11:09 AM Triglyceride  121 mg/dL ( - <150)   9/21/2021 11:09 AM LDL  75    9/21/2021 11:09 AM Cholesterol/HDL Ratio  3.5 ( - <5.0)   9/21/2021 11:09 AM Non-HDL Cholesterol  95 ( - <130)

## 2022-02-16 NOTE — TELEPHONE ENCOUNTER
Order, demographics, and notes faxed to [facility], they will contact patient to schedule.

## 2022-02-16 NOTE — NURSING NOTE
Vital Signs Entered On:  7/21/2021 2:53 PM CDT    Performed On:  7/21/2021 2:53 PM CDT by Angelica Persaud MA               Vital Signs   Systolic Blood Pressure :   120 mmHg   Diastolic Blood Pressure :   80 mmHg   Mean Arterial Pressure :   93 mmHg   BP Site :   Right arm   Angelica Persaud MA - 7/21/2021 2:53 PM CDT   no abrasions, no jaundice, no lesions, no pruritis, and no rashes.

## 2022-02-16 NOTE — PROGRESS NOTES
Chief Complaint    needs DOT clearance post heart surgery.  History of Present Illness       Patient is here for follow-up regarding his coronary disease.  He had coronary artery bypass grafting 3 months ago.  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  He currently is feeling well.  Has some fatigue at the end of the day.  He has been quite busy lately with a home remodeling project.  He denies ED chest pain, shortness of breath, or anginal equivalents.  He successfully completed cardiac rehab.  He has had follow-up visits with cardiology.  Review of Systems       See HPI.  All other review of systems negative.  Physical Exam   Vitals & Measurements    T: 97.4  F (Tympanic)  HR: 59 (Peripheral)  BP: 120/80  SpO2: 98%     HT: 69.25 in  WT: 267.3 lb  BMI: 39.18        Alert, oriented, no acute distress       Neck supple without adenopathy, thyromegaly, carotid bruit       Heart has a regular rate and rhythm, no murmur, no gallop, no rub       Sternotomy incision is healing well, no tenderness       Lungs are clear       No lower extremity edema       Cooperative, appropriate affect  Assessment/Plan       History of coronary artery bypass grafting with good recovery.  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  He is cleared to return to driving as tolerated.       Follow-up as needed  Patient Information     Name:MAX RUVALCABA      Address:      [address] [id number]     Sex:Male     YOB: 1955     [phone]     Emergency Contact:DAVID RUVALCABA     MRN:[id number]     [id number]     Location:[facility]     Date of Service:07/21/2021      Primary Care Physician:       Jeff Haider MD, [phone]      Attending Physician:       Jeff Haider MD, [phone]  Problem List/Past Medical History    Ongoing     Chronic heartburn     Hypertension     Obesity      ANNA on CPAP       Comments: Moderate ANNA with AHI 19 CPAP 9     Osteoarthritis of Knee     Osteoarthritis of right shoulder       Comments: With an associated large rotator cuff tear.    Historical     Inpatient stay       Comments: [facility] - Unstable angina status post CABG x 2.     Rotator cuff tear  Procedure/Surgical History     CABG x 2 - Coronary artery bypass grafts x 2 (03/25/2021)     Colonoscopy (09/10/2019)      Comments: Sedation: conscious      Polyps: none      Diverticuli: no      Follow up: 10 years (Sept 2029).     Left rotator cuff repair (12/15/2015)     Revision total joint arthroplasty, left knee (11/17/2011)     Arthroscopy of knee (03/08/2010)      Comments: Left knee surgery/[place], [place]..     Colonoscopy (07/15/2009)      Comments: Indication:  screening      Sedation:  Midazolam, Fentanyl--IV      Findings:  no polyps seen      Recommendation:  f/u 10yrs.     Replacement of left knee joint (01/08/2008)     Spermatocelectomy (09/17/2007)      Comments: Dr. Gallardo.  Medications    aspirin 81 mg oral delayed release tablet, 162 mg= 2 tab(s), Oral, daily    Daily Multiple Vitamins, 1 tab(s), Oral, daily    heated tubing, filters, nasal mask with cushion replacment, See Instructions    metoprolol succinate 25 mg oral tablet, extended release, 25 mg= 1 tab(s), Oral, daily    omeprazole 20 mg oral delayed release capsule, 20 mg= 1 cap(s), Oral, bid, 1 refills    rosuvastatin 5 mg oral tablet, 5 mg= 1 tab(s), Oral, daily    sildenafil 100 mg oral tablet, 100 mg= 1 tab(s), Oral, daily, 2 refills  Allergies    ibuprofen (GI upset)  Social History    Smoking Status     Never smoker     Alcohol - Current      Current     Electronic Cigarette/Vaping      Electronic Cigarette Use: Never.     Employment/School      Work/School description: .     Exercise      Exercise type: Bicycling, back-strengthening exercises.     Home/Environment      Marital status:   (Living together). Spouse/Partner name: Darlene.     Substance Abuse      Never     Tobacco      Never (less than 100 in lifetime)  Family History    Heart disease: Mother and Father.    Hypercholesterolemia: Father.    Hypertension: Mother and Father.    Myocardial infarction: Mother.    Sleep apnea syndrome: Mother.  Immunizations          Scheduled Immunizations          Dose Date(s)          influenza          11/19/2011          SARS-CoV-2 (COVID-19) Moderna-1273          03/12/2021, 04/09/2021          Td          02/24/2004          Other Immunizations          influenza virus vaccine, inactivated          11/20/2014, 10/03/2016, 10/08/2018, 11/18/2019, 09/30/2020          Td          03/22/1995, 01/25/2006          tetanus/diphth/pertuss (Tdap) adult/adol          10/03/2016

## 2022-02-16 NOTE — TELEPHONE ENCOUNTER
---------------------  From: Jeff Haider MD   To: [facility] [employer] ([id number]);     Sent: 9/22/2021 8:46:12 AM CDT  Subject: General Message     Patient would like to obtain supplies through [employer]---------------------  From: Marta Pyle ([facility] ([id number]))   To: Jeff Haider MD;     Sent: 9/22/2021 10:54:25 AM CDT  Subject: RE: General Message     Patient would need a RX written for CPAP supplies.  ** Submitted: **  Order:Miscellaneous Rx Supply (CPAP supplies, mask and tubing)  See Instructions  use nightly  Qty:  1 EA        Refills:  0          Substitutions Allowed     Don't Print - other reason (Rx)    Signed by Jeff Haider MD  9/22/2021 8:26:00 PM [facility]UST---------------------  From: Jeff Haider MD   To: [facility] ([id number]);     Sent: 9/22/2021 3:27:54 PM CDT  Subject: RE: General Message

## 2022-02-16 NOTE — NURSING NOTE
Comprehensive Intake Entered On:  9/21/2021 10:17 AM CDT    Performed On:  9/21/2021 10:00 AM CDT by Hung BRAXTON, Angelica               Summary   Chief Complaint :   DOT px   Weight Measured :   272.7 lb(Converted to: 272 lb 11 oz, 123.695 kg)    Height Measured :   69.25 in(Converted to: 5 ft 9 in, 175.89 cm)    Body Mass Index :   39.98 kg/m2 (HI)    Body Surface Area :   2.46 m2   Systolic Blood Pressure :   154 mmHg (HI)    Diastolic Blood Pressure :   81 mmHg (HI)    Mean Arterial Pressure :   105 mmHg   Peripheral Pulse Rate :   60 bpm   BP Site :   Right arm   Pulse Site :   Radial artery   BP Method :   Electronic   HR Method :   Electronic   Temperature Tympanic :   96.7 DegF(Converted to: 35.9 DegC)  (LOW)    Angelica Persaud MA - 9/21/2021 10:00 AM CDT   Health Status   Allergies Verified? :   Yes   Medication History Verified? :   Yes   Medical History Verified? :   Yes   Pre-Visit Planning Status :   Completed   Tobacco Use? :   Never smoker   Angelica Persaud MA - 9/21/2021 10:00 AM CDT   Consents   Consent for Immunization Exchange :   Consent Granted   Consent for Immunizations to Providers :   Consent Granted   Angelica Persaud MA - 9/21/2021 10:00 AM CDT   Meds / Allergies   (As Of: 9/21/2021 10:17:43 AM CDT)   Allergies (Active)   ibuprofen  Estimated Onset Date:   Unspecified ; Reactions:   GI upset ; Created By:   Cheri Harris CMA; Reaction Status:   Active ; Category:   Drug ; Substance:   ibuprofen ; Type:   Intolerance ; Updated By:   Cheri Harris CMA; Reviewed Date:   3/4/2021 11:16 AM CST        Medication List   (As Of: 9/21/2021 10:17:43 AM CDT)   Prescription/Discharge Order    Miscellaneous Rx Supply  :   Miscellaneous Rx Supply ; Status:   Prescribed ; Ordered As Mnemonic:   heated tubing, filters, nasal mask with cushion replacment ; Simple Display Line:   See Instructions, use as directed, 1 EA, 0 Refill(s) ; Ordering Provider:   Jeff Haider MD; Catalog Code:   Miscellaneous Rx  Supply ; Order Dt/Tm:   10/11/2016 5:53:33 PM CDT ; Comment:   Months = 99 (Lifetime)          omeprazole  :   omeprazole ; Status:   Prescribed ; Ordered As Mnemonic:   omeprazole 20 mg oral delayed release capsule ; Simple Display Line:   20 mg, 1 cap(s), Oral, bid, for 90 day(s), 180 cap(s), 1 Refill(s) ; Ordering Provider:   Jeff Haider MD; Catalog Code:   omeprazole ; Order Dt/Tm:   12/14/2020 10:34:03 AM CST          sildenafil  :   sildenafil ; Status:   Prescribed ; Ordered As Mnemonic:   sildenafil 100 mg oral tablet ; Simple Display Line:   100 mg, 1 tab(s), Oral, daily, 30 tab(s), 2 Refill(s) ; Ordering Provider:   Jeff Haider MD; Catalog Code:   sildenafil ; Order Dt/Tm:   11/18/2019 11:49:27 AM CST            Home Meds    aspirin  :   aspirin ; Status:   Documented ; Ordered As Mnemonic:   aspirin 81 mg oral delayed release tablet ; Simple Display Line:   162 mg, 2 tab(s), Oral, daily, 0 Refill(s) ; Catalog Code:   aspirin ; Order Dt/Tm:   7/21/2021 2:13:34 PM CDT          metoprolol  :   metoprolol ; Status:   Documented ; Ordered As Mnemonic:   metoprolol succinate 25 mg oral tablet, extended release ; Simple Display Line:   25 mg, 1 tab(s), Oral, daily, 30 tab(s), 0 Refill(s) ; Catalog Code:   metoprolol ; Order Dt/Tm:   4/5/2021 12:09:53 PM CDT          multivitamin  :   multivitamin ; Status:   Documented ; Ordered As Mnemonic:   Daily Multiple Vitamins ; Simple Display Line:   1 tab(s), po, daily, 0 Refill(s) ; Catalog Code:   multivitamin ; Order Dt/Tm:   10/3/2016 12:37:06 PM CDT          rosuvastatin  :   rosuvastatin ; Status:   Documented ; Ordered As Mnemonic:   rosuvastatin 5 mg oral tablet ; Simple Display Line:   5 mg, 1 tab(s), Oral, daily, 0 Refill(s) ; Catalog Code:   rosuvastatin ; Order Dt/Tm:   7/21/2021 2:12:54 PM CDT            Vision Testing POC   Corrective Lenses :   None   Color Blind Correct Plates :   normal   Eye, Left Visual Acuity :   20/25   Eye, Right  Visual Acuity :   20/20   Eye, Bilateral Visual Acuity :   20/20   Hung BRAXTON, Angelica - 9/21/2021 10:00 AM CDT   Social History   Social History   (As Of: 9/21/2021 10:17:43 AM CDT)   Alcohol:  Current      Current   (Last Updated: 11/29/2011 12:20:21 PM CST by Angelica Larsen)          Tobacco:        Never (less than 100 in lifetime)   (Last Updated: 12/14/2020 9:25:30 AM CST by Margarita De LPN)          Electronic Cigarette/Vaping:        Electronic Cigarette Use: Never.   (Last Updated: 12/14/2020 9:25:34 AM CST by Margarita De LPN)          Substance Abuse:        Never   (Last Updated: 11/29/2011 12:20:37 PM CST by Angelica Larsen)          Employment/School:        Work/School description: .   (Last Updated: 11/29/2011 12:29:46 PM CST by Angelica Larsen)          Home/Environment:        Marital status:  (Living together).  Spouse/Partner name: Darlene.   (Last Updated: 11/29/2011 12:30:55 PM CST by Angelica Larsen)          Exercise:        Exercise type: Exercise with farming..   (Last Updated: 11/29/2011 12:26:33 PM CST by Angelica Larsen)   Exercise type: Bicycling, back-strengthening exercises.   (Last Updated: 11/29/2011 12:37:24 PM CST by Angelica Larsen)

## 2022-02-16 NOTE — PROGRESS NOTES
Chief Complaint    HTN, Medication check and refills. right hip pain increasing.  History of Present Illness       Patient is here for follow up on HTN.  It has been under fair control over the last year with lisinopril.  He has no concerns with the medication.  His weight has remained stable.  Cough and left chest discomfort is persistent but intermittent       Right hip pain has been worsening over the last few months.  Pain is in the buttock radiating to the groin.  He fell landing on the right buttock five years ago.  Review of Systems          ROS reviewed and negative except for symptoms noted in HPI.  Physical Exam   Vitals & Measurements    T: 97.1  F (Tympanic)  HR: 72 (Peripheral)  BP: 136/82     HT: 69.25 in  WT: 276 lb  BMI: 40.46           General:  Alert and oriented, No acute distress.            Eye:  Normal conjunctiva.            HENT:  Normocephalic.            Neck:  Supple, Non-tender.            Respiratory:  Respirations are non-labored.  Lungs are clear, no chest wall tenderness          Cardiovascular:  Regular rate and rhythm, no murmur, gallop, rub          Musculoskeletal:            Lower extremity exam: Right          Hip: Mild anterior tenderness, normal range of motion, normal strength, pain with range of motion          Thigh: Normal          Knee: Tenderness over patella          Lower leg: Normal          Ankle: Normal          Foot: Normal          Toe: Normal          Psychiatric:  Cooperative, Appropriate mood & affect.    Assessment/Plan       1. Hypertension (I10)          Controlled, continue with current medication         Ordered:          CBC (h/h, RBC, indices, WBC, Plt)* ([facility]), Specimen Type: Blood, Collection Date: 12/14/20 9:53:00 CST                2. ANNA on CPAP (G47.33)          Controlled, continue with CPAP                3. Right hip pain (M25.551)         appears to be soft tissue         Ordered:          Physical Therapy (Request), Right hip pain          XR  Hip Min 2 Views Right (Request), Right hip pain                4. Chronic heartburn (R12)         increase omeprazole to 20 mg bid                Orders:         lisinopril, = 1 tab(s) ( 10 mg ), Oral, daily, # 30 tab(s), 0 Refill(s), Type: Hard Stop, Pharmacy: [facility], Pt due for HTN f/u for further refills., 69.25, in, 09/30/20 9:03:00 CDT, Height Measured, 274.8, lb, 09/30/20 9:03:00 CDT, W..., (Completed)         lisinopril, = 1 tab(s) ( 10 mg ), Oral, daily, # 90 tab(s), 3 Refill(s), Type: Maintenance, Pharmacy: [facility], 1 tab(s) Oral daily,x90 day(s), 69.25, in, 12/14/20 9:25:00 CST, Height Measured, 276, lb, 12/14/20 9:25:00 CST, Weight Shonda..., (Ordered)         omeprazole, = 1 cap(s) ( 20 mg ), Oral, bid, # 180 cap(s), 1 Refill(s), Type: Maintenance, Pharmacy: [facility], 1 cap(s) Oral bid,x90 day(s), 69.25, in, 12/14/20 9:25:00 CST, Height Measured, 276, lb, 12/14/20 9:25:00 CST, Weight Measured, (Ordered)         omeprazole, = 1 cap(s) ( 20 mg ), Oral, daily, # 30 cap(s), 0 Refill(s), Type: Hard Stop, Pharmacy: [facility], Pt due for med check for further refills., 69.25, in, 09/30/20 9:03:00 CDT, Height Measured, 274.8, lb, 09/30/20 9:03:00 CDT,..., (Completed)  Patient Information     Name:MAX RUVALCABA JUAN      Address:      [address] [id number]     Sex:Male     YOB: 1955     [phone]     Emergency Contact:DAVID RUVALCABA     MRN:[id number]     FIN:[id number]     Location:[facility], [place]     Date of Service:12/14/2020      Primary Care Physician:       Jeff Haider MD, [phone]      Attending Physician:       Jeff Haider MD, [phone]  Problem List/Past Medical History    Ongoing     Chronic heartburn     Hypertension     Obesity     ANNA on CPAP       Comments: Moderate ANNA with AHI 19 CPAP 9     Osteoarthritis of  Knee     Osteoarthritis of right shoulder       Comments: With an associated large rotator cuff tear.    Historical     Rotator cuff tear  Procedure/Surgical History     Colonoscopy (09/10/2019)      Comments: Sedation: conscious      Polyps: none      Diverticuli: no      Follow up: 10 years (Sept 2029).     Left rotator cuff repair (12/15/2015)     Revision total joint arthroplasty, left knee (11/17/2011)     Arthroscopy of knee (03/08/2010)      Comments: Left knee surgery/[place], [place]..     Colonoscopy (07/15/2009)      Comments: Indication:  screening      Sedation:  Midazolam, Fentanyl--IV      Findings:  no polyps seen      Recommendation:  f/u 10yrs.     Replacement of left knee joint (01/08/2008)     Spermatocelectomy (09/17/2007)      Comments: Dr. Gallardo.  Medications    Daily Multiple Vitamins, 1 tab(s), Oral, daily    heated tubing, filters, nasal mask with cushion replacment, See Instructions    Iron Chews, 65 mg, Oral, daily    lisinopril 10 mg oral tablet, 10 mg= 1 tab(s), Oral, daily, 3 refills    omeprazole 20 mg oral delayed release capsule, 20 mg= 1 cap(s), Oral, bid, 1 refills    sildenafil 100 mg oral tablet, 100 mg= 1 tab(s), Oral, daily, 2 refills  Allergies    ibuprofen (GI upset)  Social History    Smoking Status     Never smoker     Alcohol - Current      Current     Electronic Cigarette/Vaping      Electronic Cigarette Use: Never.     Employment/School      Work/School description: .     Exercise      Exercise type: Bicycling, back-strengthening exercises.     Home/Environment      Marital status:  (Living together). Spouse/Partner name: Darlene.     Substance Abuse      Never     Tobacco      Never (less than 100 in lifetime)  Family History    Heart disease: Mother and Father.    Hypercholesterolemia: Father.    Hypertension: Mother and Father.    Myocardial infarction: Mother.    Sleep apnea syndrome: Mother.  Immunizations      Vaccine Date Status           influenza virus vaccine, inactivated 09/30/2020 Given          influenza virus vaccine, inactivated 11/18/2019 Given          influenza virus vaccine, inactivated 10/08/2018 Given          tetanus/diphth/pertuss (Tdap) adult/adol 10/03/2016 Given          influenza virus vaccine, inactivated 10/03/2016 Given          influenza virus vaccine, inactivated 11/20/2014 Given          influenza 11/19/2011 Recorded          Td 01/25/2006 Recorded          Td 02/24/2004 Recorded          Td 03/22/1995 Recorded  Lab Results          Lab Results (Last 4 results within 90 days)          Cholesterol: 188 mg/dL (09/30/20 10:01:00)          Non-HDL Cholesterol: 150 High (09/30/20 10:01:00)          HDL: 38 mg/dL Low (09/30/20 10:01:00)          Cholesterol/HDL Ratio: 4.9 (09/30/20 10:01:00)          LDL: 128 High (09/30/20 10:01:00)          Triglyceride: 116 mg/dL (09/30/20 10:01:00)          WBC: 6.9 [3.8  - 10.8] (09/30/20 10:01:00)          RBC: 4.98 [4.2  - 5.8] (09/30/20 10:01:00)          Hgb: 13.3 gm/dL [13.2 gm/dL - 17.1 gm/dL] (09/30/20 10:01:00)          Hct: 40 % [38.5 % - 50 %] (09/30/20 10:01:00)          MCV: 80.3 fL [80 fL - 100 fL] (09/30/20 10:01:00)          MCH: 26.7 pg Low [27 pg - 33 pg] (09/30/20 10:01:00)          MCHC: 33.3 gm/dL [32 gm/dL - 36 gm/dL] (09/30/20 10:01:00)          RDW: 16.9 % High [11 % - 15 %] (09/30/20 10:01:00)          Platelet: 325 [140  - 400] (09/30/20 10:01:00)          MPV: 9.2 fL [7.5 fL - 12.5 fL] (09/30/20 10:01:00)          UA pH: 7 [5  - 8] (09/30/20 09:06:00)          UA Specific Gravity: 1.02 [1.001  - 1.035] (09/30/20 09:06:00)          UA Glucose: NEGATIVE [NEGATIVE  - NEGATIVE] (09/30/20 09:06:00)          UA Bilirubin: NEGATIVE [NEGATIVE  - NEGATIVE] (09/30/20 09:06:00)          UA Ketones: NEGATIVE [NEGATIVE  - NEGATIVE] (09/30/20 09:06:00)          Urine Occult Blood: NEGATIVE [NEGATIVE  - NEGATIVE] (09/30/20 09:06:00)          UA Protein: NEGATIVE [NEGATIVE  -  NEGATIVE] (09/30/20 09:06:00)          UA Nitrite: NEGATIVE [NEGATIVE  - NEGATIVE] (09/30/20 09:06:00)          UA Leukocyte Esterase: NEGATIVE [NEGATIVE  - NEGATIVE] (09/30/20 09:06:00)

## 2022-02-16 NOTE — PROGRESS NOTES
Patient:   MXA RUVALCABA            MRN: [id number]            FIN: [id number]               Age:   66 years     Sex:  Male     :  1955   Associated Diagnoses:   Encounter for examination required by Department of Transportation (DOT)   Author:   Vitaly REID, Jeff      Results Review   General results   Today's results   2021 1:03 PM CDT Patient Summary Documents DOT PHYSICAL MEDICAL EXAM/GTG    2021 11:17 AM CDT RLI Bill Type Override Form RLI Bill Type Override Form     Administrative Form RLI Bill Type Override    2021 10:00 AM CDT Height Measured - Standard 69.25 in     Weight Measured - Standard 272.7 lb     BSA 2.46 m2     Body Mass Index 39.98 kg/m2  HI     Temperature Tympanic 96.7 DegF  LOW     Peripheral Pulse Rate 60 bpm     Pulse Site Radial artery     HR Method Electronic     Systolic Blood Pressure 154 mmHg  HI     Diastolic Blood Pressure 81 mmHg  HI     Mean Arterial Pressure 105 mmHg     BP Site Right arm     BP Method Electronic     Allergies Verified? Yes     Medication History Verified? Yes     Medical History Verified? Yes     Tobacco Use? Never smoker     Pre-Visit Planning Status Completed     Corrective Lenses None     Color Blind Correct Plates normal     Eye, Right Visual Acuity 20/20     Eye, Left Visual Acuity 20/25     Eye, Bilateral Visual Acuity 20/20     Chief Complaint DOT px     Consent for Immunization Exchange Consent Granted     Consent for Immunizations to Providers Consent Granted     Comprehensive Intake Form Comprehensive Intake Form     Intake Form Comprehensive Intake    2021 12:00 AM CDT Lab Report Lab- Urinalysis- F (Transcribed)         Health Status   Allergies:    Nonallergic Reactions (Selected)  Severity Not Documented  Ibuprofen (Gi upset)   Medications:  (Selected)   Prescriptions  Prescribed  heated tubing, filters, nasal mask with cushion replacment: heated tubing, filters, nasal mask with cushion replacment, See Instructions,  Instructions: use as directed, Supply, # 1 EA, 0 Refill(s), Type: Maintenance, other reason (Rx)  omeprazole 20 mg oral delayed release capsule: = 1 cap(s) ( 20 mg ), Oral, bid, # 180 cap(s), 1 Refill(s), Type: Maintenance, Pharmacy: [facility], 1 cap(s) Oral bid,x90 day(s), 69.25, in, 12/14/20 9:25:00 CST, Height Measured, 276, lb, 12/14/20 9:25:00 CST, Weight Measured...  sildenafil 100 mg oral tablet: = 1 tab(s) ( 100 mg ), Oral, daily, # 30 tab(s), 2 Refill(s), Type: Maintenance, Pharmacy: [id number], 1 tab(s) Oral daily  Documented Medications  Documented  Daily Multiple Vitamins: 1 tab(s), po, daily, 0 Refill(s), Type: Maintenance  aspirin 81 mg oral delayed release tablet: = 2 tab(s) ( 162 mg ), Oral, daily, 0 Refill(s), Type: Maintenance  metoprolol succinate 25 mg oral tablet, extended release: = 1 tab(s) ( 25 mg ), Oral, daily, # 30 tab(s), 0 Refill(s), Type: Maintenance  rosuvastatin 5 mg oral tablet: = 1 tab(s) ( 5 mg ), Oral, daily, 0 Refill(s), Type: Maintenance   Problem list:    All Problems  Osteoarthritis of right shoulder / SNOMED CT 777234176 / Confirmed  Hypertension / SNOMED CT 3172078429 / Confirmed  Obesity / ICD-9-.00 / Probable  ANNA on CPAP / ICD-9-.23 / Confirmed  Osteoarthritis of Knee / ICD-9-.96 / Confirmed  Chronic heartburn / ICD-9-.1 / Confirmed  Resolved: Rotator cuff tear / SNOMED CT 6248219847  Resolved: Inpatient stay / SNOMED CT 333485665      Chief Complaint   9/21/2021 10:00 AM CDT   DOT px        Subjective   Problem:  Please see scanned in copy of DOT physical      Objective   Vital Signs   9/21/2021 10:00 AM CDT Temperature Tympanic 96.7 DegF  LOW    Peripheral Pulse Rate 60 bpm    Pulse Site Radial artery    HR Method Electronic    Systolic Blood Pressure 154 mmHg  HI    Diastolic Blood Pressure 81 mmHg  HI    Mean Arterial Pressure 105 mmHg    BP Site Right arm    BP Method Electronic      Measurements from  flowsheet : Measurements   9/21/2021 10:00 AM CDT Height Measured - Standard 69.25 in    Weight Measured - Standard 272.7 lb    BSA 2.46 m2    Body Mass Index 39.98 kg/m2  HI      General:  Alert and oriented, No acute distress.    Eye:  Extraocular movements are intact, Normal conjunctiva.         Visual fields: Full to confrontation both eyes.    HENT:  Normocephalic, Tympanic membranes are clear, Normal hearing, Oral mucosa is moist, No pharyngeal erythema, No sinus tenderness.    Neck:  Supple, Non-tender, No lymphadenopathy.    Respiratory:  Lungs are clear to auscultation, Respirations are non-labored.    Cardiovascular:  Normal rate, Regular rhythm.    Gastrointestinal:  Soft, Non-tender, No organomegaly.    Musculoskeletal:  Normal range of motion, Normal strength, No tenderness.    Integumentary:  Warm, Dry.    Neurologic:  Alert, Oriented.    Psychiatric:  Cooperative, Appropriate mood & affect.       Plan   Impression and Plan:  Orders   .    Diagnosis     Encounter for examination required by Department of Transportation (DOT) (YMW36-AI Z02.89).     .    Condition stable, and certification approved for one year.

## 2022-02-16 NOTE — PROGRESS NOTES
Patient:   MAX RUVALCABA            MRN: [id number]            FIN: [id number]               Age:   64 years     Sex:  Male     :  1955   Associated Diagnoses:   Colon cancer screening   Author:   Jeff Berumen MD      Visit Information      Date of Service: 2019 08:10 am  Performing Location: [facility]  Encounter#: [id number]      Primary Care Provider (PCP):  Jeff Haider MD    NPI# [id number]      Referring Provider:  Jeff Berumen MD    NPI# [id number]      Chief Complaint   2019 8:25 AM CDT     Colonoscopy consult        History of Present Illness   Normal colonoscopy  and desires repeat.   Denies diarrhea, constipation and blood in stool.         Review of Systems   Constitutional:  No fever.    Respiratory:  No shortness of breath.    Cardiovascular:  No chest pain.    Gastrointestinal:  No vomiting, No diarrhea, No constipation.    Hematology/Lymphatics:  No bruising tendency, No bleeding tendency.    All other systems reviewed and negative     No history of bleeding disorders or blood transfusion  No prior problems with anesthesia  No family history of anesthesia problems  Uses CPAP for sleep apnea  Denies plavix, coumadin  Denies history of DVT/PE  Denies recent corticosteroid use    Able to walk a flight of stairs without chest pain or shortness of breath.      Health Status   Allergies:    Nonallergic Reactions (Selected)  Severity Not Documented  Ibuprofen (Gi upset)   Medications:  (Selected)   Prescriptions  Prescribed  heated tubing, filters, nasal mask with cushion replacment: heated tubing, filters, nasal mask with cushion replacment, See Instructions, Instructions: use as directed, Supply, # 1 EA, 0 Refill(s), Type: Maintenance, other reason (Rx)  lisinopril 10 mg oral tablet: = 1 tab(s) ( 10 mg ), Oral, daily, # 90 tab(s), 3 Refill(s), Type: Maintenance, Pharmacy: [facility] #[id number], 1 tab(s) Oral daily  omeprazole 20 mg oral delayed release  capsule: = 1 cap(s) ( 20 mg ), PO, Daily, # 90 cap(s), 3 Refill(s), Type: Maintenance, Pharmacy: [facility] #[id number], 1 cap(s) Oral daily  sildenafil 50 mg oral tablet: = 1 tab(s) ( 50 mg ), Oral, daily, # 15 tab(s), 0 Refill(s), Type: Maintenance, Pharmacy: [facility] #[id number], 1 tab(s) Oral daily  Documented Medications  Documented  Daily Multiple Vitamins: 1 tab(s), po, daily, 0 Refill(s), Type: Maintenance   Problem list:    All Problems  Chronic heartburn / ICD-9-.1 / Confirmed  Osteoarthritis of right shoulder / SNOMED CT 981242037 / Confirmed  Hypertension / SNOMED CT 9237819298 / Confirmed  Obesity / ICD-9-.00 / Probable  ANNA on CPAP / ICD-9-.23 / Confirmed  Osteoarthritis of Knee / ICD-9-.96 / Confirmed  Resolved: Rotator cuff tear / SNOMED CT 2708828823      Histories   Procedure history:    [facility] 60 [facility]  Revision total joint arthroplasty, left knee on 11/17/2011 at 56 Years.  Arthroscopy of knee (SNOMED CT 931663616) on 3/8/2010 at 54 Years.  Comments:  11/29/2011 12:24 PM Angelica Mcdermott  Left knee surgery/Dr. Abdi, [place], [place].  Colonoscopy (SNOMED CT 957188303) performed by Mary REID [place] on 7/15/2009 at 54 Years.  Comments:  6/6/2017 4:53 PM [facility] - Angelica Persaud MA  Indication:  screening  Sedation:  Midazolam, Fentanyl--IV  Findings:  no polyps seen  Recommendation:  f/u 10yrs  Replacement of left knee joint (SNOMED CT 9161678527) on 1/8/2008 at 52 Years.  Spermatocelectomy (SNOMED CT 69277266) on 9/17/2007 at 52 Years.  Comments:  11/29/2011 12:27 PM Angelica Mcdermott Dr.     Family History: no colon polyps or cancer  Social History:  (Darlene) 1979. Nonsmoker. 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 Three children      Physical Examination   Vital Signs   8/5/2019 8:25 AM CDT Temperature Tympanic 97.5 DegF  LOW    Peripheral Pulse Rate 78 bpm    Pulse  Site Radial artery    HR Method Manual    Systolic Blood Pressure 128 mmHg    Diastolic Blood Pressure 76 mmHg    Mean Arterial Pressure 93 mmHg    BP Site Right arm    BP Method Manual      Measurements from flowsheet : Measurements   8/5/2019 8:25 AM CDT Height Measured - Standard 69.25 in    Weight Measured - Standard 276.4 lb    BSA 2.47 m2    Body Mass Index 40.52 kg/m2  HI      General:  Alert and oriented, No acute distress.    HENT:  No pharyngeal erythema.    Neck:  No lymphadenopathy.    Respiratory:  Lungs are clear to auscultation.    Cardiovascular:  Normal rate, Regular rhythm.    Gastrointestinal:  Soft, Non-tender, Non-distended.    Musculoskeletal:  Normal gait.    Neurologic:  No focal deficits.    Psychiatric:  Appropriate mood & affect.       Review / Management   Results review:  Lab results: 10/8/2018 10:51 AM CDT   Creatinine Level          0.96 mg/dL  .       Impression and Plan   Diagnosis     Colon cancer screening (RPH90-VX Z12.11).       Discussed risks and benefits of colonoscopy and will schedule.

## 2022-02-16 NOTE — NURSING NOTE
Comprehensive Intake Entered On:  8/11/2020 1:08 PM CDT    Performed On:  8/11/2020 1:04 PM CDT by Cheri Harris CMA               Summary   Chief Complaint :   c/o ongoing cough x 6 months with phlegm, chest discomfort  on left side x 6 months verbal consent given for video visit   Cheri Harris CMA - 8/11/2020 1:04 PM CDT   Health Status   Allergies Verified? :   Yes   Medication History Verified? :   Yes   Medical History Verified? :   No   Pre-Visit Planning Status :   Completed   Tobacco Use? :   Never smoker   Cheri Harris CMA - 8/11/2020 1:04 PM CDT   Consents   Consent for Immunization Exchange :   Consent Granted   Consent for Immunizations to Providers :   Consent Granted   Cheri Harris CMA - 8/11/2020 1:04 PM CDT   Meds / Allergies   (As Of: 8/11/2020 1:08:16 PM CDT)   Allergies (Active)   ibuprofen  Estimated Onset Date:   Unspecified ; Reactions:   GI upset ; Created By:   Cheri Harris CMA; Reaction Status:   Active ; Category:   Drug ; Substance:   ibuprofen ; Type:   Intolerance ; Updated By:   Cheri Harris CMA; Reviewed Date:   8/11/2020 1:06 PM CDT        Medication List   (As Of: 8/11/2020 1:08:16 PM CDT)   Prescription/Discharge Order    lisinopril  :   lisinopril ; Status:   Prescribed ; Ordered As Mnemonic:   lisinopril 10 mg oral tablet ; Simple Display Line:   1 tab(s), Oral, daily, 90 tab(s), 3 Refill(s) ; Ordering Provider:   Jeff Haider MD; Catalog Code:   lisinopril ; Order Dt/Tm:   11/18/2019 11:49:28 AM CST          Miscellaneous Rx Supply  :   Miscellaneous Rx Supply ; Status:   Prescribed ; Ordered As Mnemonic:   heated tubing, filters, nasal mask with cushion replacment ; Simple Display Line:   See Instructions, use as directed, 1 EA, 0 Refill(s) ; Ordering Provider:   Jeff Haider MD; Catalog Code:   Miscellaneous Rx Supply ; Order Dt/Tm:   10/11/2016 5:53:33 PM CDT ; Comment:   Months = 99 (Lifetime)          omeprazole  :   omeprazole ; Status:   Prescribed ;  Ordered As Mnemonic:   omeprazole 20 mg oral delayed release capsule ; Simple Display Line:   1 cap(s), Oral, daily, 90 cap(s), 3 Refill(s) ; Ordering Provider:   Jeff Haider MD; Catalog Code:   omeprazole ; Order Dt/Tm:   11/18/2019 11:49:35 AM CST          sildenafil  :   sildenafil ; Status:   Prescribed ; Ordered As Mnemonic:   sildenafil 100 mg oral tablet ; Simple Display Line:   100 mg, 1 tab(s), Oral, daily, 30 tab(s), 2 Refill(s) ; Ordering Provider:   Jeff Haider MD; Catalog Code:   sildenafil ; Order Dt/Tm:   11/18/2019 11:49:27 AM CST            Home Meds    multivitamin  :   multivitamin ; Status:   Documented ; Ordered As Mnemonic:   Daily Multiple Vitamins ; Simple Display Line:   1 tab(s), po, daily, 0 Refill(s) ; Catalog Code:   multivitamin ; Order Dt/Tm:   10/3/2016 12:37:06 PM CDT            ID Risk Screen   Recent Travel History :   No recent travel   Family Member Travel History :   No recent travel   Other Exposure to Infectious Disease :   Unknown   Cheri Harris CMA - 8/11/2020 1:04 PM CDT

## 2022-02-16 NOTE — PROGRESS NOTES
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  History of Present Illness      Mustapha is here with complaints of dizziness.  He noticed it yesterday when arising from bed.  Symptoms seem to be triggered by movement.  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      No recent illness, no nausea or vomiting, no headache, change in hearing or tinnitus      Continues to have intermittent chest discomfort at rest and sometimes with activity      Concerned about heart issues  Review of Systems          ROS reviewed and negative except for symptoms noted in HPI.  Physical Exam   Vitals & Measurements    T: 98.5  F (Tympanic)  HR: 70 (Peripheral)  BP: 146/94     HT: 69.25 in  WT: 282 lb  BMI: 41.34           General:  Alert and oriented, No acute distress.            Eye:  Normal conjunctiva, Vision unchanged, mild nystagmus on right gaze          HENT:  Normocephalic, Tympanic membranes are clear, Oral mucosa is moist, No pharyngeal erythema.            Neck:  Supple, Non-tender, No carotid bruit, No lymphadenopathy, No thyromegaly.            Respiratory:  Lungs are clear to auscultation, Respirations are non-labored.            Cardiovascular:  Normal rate, Regular rhythm, Normal peripheral perfusion.            Gastrointestinal:  Soft, Non-tender.            Genitourinary:  No costovertebral angle tenderness.            Musculoskeletal:  Normal range of motion, Normal strength.            Integumentary:  Warm, Dry, No rash.            Neurologic:  Alert, Oriented.            Psychiatric:  Cooperative, Appropriate mood & affect.    Assessment/Plan       1. 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 Chest pain, atypical (R07.89)         Given persistence of symptoms will set up for stress test         Ordered:          Referral (Request), 03/04/21 11:53:00 CST, Referred to: Cardiology, Chest pain, atypical          Stress Echocardiogram, Treadmill (Request), Chest pain, atypical           Patient Information     Name:MAX RUVALCABA      Address:      [address] [id number]     Sex:Male     YOB: 1955     [phone]     Emergency Contact:DAVID RUVALCABA     MRN:[id number]     FIN:[id number]     Location:[facility]     Date of Service:03/04/2021      Primary Care Physician:       Jeff Haider MD, [phone]      Attending Physician:       Jeff Haider MD, [phone]  Problem List/Past Medical History    Ongoing     Chronic heartburn     Hypertension     Obesity     ANNA on CPAP       Comments: Moderate ANNA with AHI 19 CPAP 9     Osteoarthritis of Knee     Osteoarthritis of right shoulder       Comments: With an associated large rotator cuff tear.    Historical     Rotator cuff tear  Procedure/Surgical History     Colonoscopy (09/10/2019)            Comments: Sedation: conscious      Polyps: none      Diverticuli: no      Follow up: 10 years (Sept 2029).     Left rotator cuff repair (12/15/2015)           Revision total joint arthroplasty, left knee (11/17/2011)           Arthroscopy of knee (03/08/2010)            Comments: Left knee surgery/Dr. Abdi, [place], [place]..     Colonoscopy (07/15/2009)            Comments: Indication:  screening      Sedation:  Midazolam, Fentanyl--IV      Findings:  no polyps seen      Recommendation:  f/u 10yrs.     Replacement of left knee joint (01/08/2008)           Spermatocelectomy (09/17/2007)            Comments: Dr. Gallardo.  Medications    Daily Multiple Vitamins, 1 tab(s), Oral, daily    heated tubing, filters, nasal mask with cushion replacment, See  Instructions    Iron Chews, 65 mg, Oral, daily    lisinopril 10 mg oral tablet, 10 mg= 1 tab(s), Oral, daily, 3 refills    omeprazole 20 mg oral delayed release capsule, 20 mg= 1 cap(s), Oral, bid, 1 refills    sildenafil 100 mg oral tablet, 100 mg= 1 tab(s), Oral, daily, 2 refills  Allergies    ibuprofen (GI upset)  Social History    Smoking Status     Never smoker     Alcohol - Current      Current     Electronic Cigarette/Vaping      Electronic Cigarette Use: Never.     Employment/School      Work/School description: .     Exercise      Exercise type: Bicycling, back-strengthening exercises.     Home/Environment      Marital status:  (Living together). Spouse/Partner name: Darlene.     Substance Abuse      Never     Tobacco      Never (less than 100 in lifetime)  Family History    Heart disease: Mother and Father.    Hypercholesterolemia: Father.    Hypertension: Mother and Father.    Myocardial infarction: Mother.    Sleep apnea syndrome: Mother.  Immunizations      Vaccine Date Status          influenza virus vaccine, inactivated 09/30/2020 Given          influenza virus vaccine, inactivated 11/18/2019 Given          influenza virus vaccine, inactivated 10/08/2018 Given          tetanus/diphth/pertuss (Tdap) adult/adol 10/03/2016 Given          influenza virus vaccine, inactivated 10/03/2016 Given          influenza virus vaccine, inactivated 11/20/2014 Given          influenza 11/19/2011 Recorded          Td 01/25/2006 Recorded          Td 02/24/2004 Recorded          Td 03/22/1995 Recorded  Lab Results       Lab Results (Last 4 results within 90 days)        WBC: 6.7 [3.8  - 10.8] (12/14/20 10:41:00)       RBC: 4.84 [4.2  - 5.8] (12/14/20 10:41:00)       Hgb: 13.6 gm/dL [13.2 gm/dL - 17.1 gm/dL] (12/14/20 10:41:00)       Hct: 40.4 % [38.5 % - 50 %] (12/14/20 10:41:00)       MCV: 83.5 fL [80 fL - 100 fL] (12/14/20 10:41:00)       MCH: 28.1 pg [27 pg - 33 pg] (12/14/20 10:41:00)       MCHC:  33.7 gm/dL [32 gm/dL - 36 gm/dL] (12/14/20 10:41:00)       RDW: 14.6 % [11 % - 15 %] (12/14/20 10:41:00)       Platelet: 304 [140  - 400] (12/14/20 10:41:00)       MPV: 9.2 fL [7.5 fL - 12.5 fL] (12/14/20 10:41:00)

## 2022-02-16 NOTE — PROGRESS NOTES
Chief Complaint    follow up heart surgery. 03/25/21  History of Present Illness      Mustapha is here for follow up after recent hospitalization for a 2 vessel CABG.  He is doing well and has started cardiac rehab.  New medications include metoprolol and atorvastatin.  Discharge summary and all other hospital documents reviewed.  Review of Systems          ROS reviewed and negative except for symptoms noted in HPI.  Physical Exam   Vitals & Measurements    HR: 72 (Peripheral)  BP: 132/78     HT: 69.25 in  WT: 273 lb  BMI: 40.02             General:  Alert and oriented, No acute distress.             Eye: Normal conjunctiva.             HENT:  Oral mucosa is moist.             Neck:  Supple.             Respiratory:  Respirations are non-labored.  Lungs are clear, sternotomy incision is healing well           Cardiovascular:  Normal rate, regular rhythm           Musculoskeletal:  Normal gait.             Psychiatric:  Cooperative, Appropriate mood & affect, Normal judgment.  Assessment/Plan       1. CAD (coronary artery disease) (I25.10)         Discussed follow up and gradual return to activity        Medications reviewed        Follow up in 3-4 months for repeat lipid panel, discussed goals for LDL       Orders:         lisinopril, = 1 tab(s) ( 10 mg ), Oral, daily, # 90 tab(s), 3 Refill(s), Type: Maintenance, Pharmacy: [facility], 1 tab(s) Oral daily,x90 day(s), 69.25, in, 12/14/20 9:25:00 CST, Height Measured, 276, lb, 12/14/20 9:25:00 CST, Weight Shonda..., (Completed)  Patient Information     Name:MAX RUVALCABA      Address:      [address] [id number]     Sex:Male     YOB: 1955     [phone]     Emergency Contact:DAVID RUVALCABA     MRN:[id number]     FIN:[id number]     Location:[facility]     Date of Service:04/05/2021      Primary Care Physician:       Jeff Haider MD, [phone]      Attending Physician:       Vitaly REID,  Jeff, [phone]  Problem List/Past Medical History    Ongoing     Chronic heartburn     Hypertension     Obesity     ANNA on CPAP       Comments: Moderate ANNA with AHI 19 CPAP 9     Osteoarthritis of Knee     Osteoarthritis of right shoulder       Comments: With an associated large rotator cuff tear.    Historical     Inpatient stay       Comments: [facility],  - Unstable angina status post CABG x 2.     Rotator cuff tear  Procedure/Surgical History     CABG x 2 - Coronary artery bypass grafts x 2 (03/25/2021)           Colonoscopy (09/10/2019)            Comments: Sedation: conscious      Polyps: none      Diverticuli: no      Follow up: 10 years (Sept 2029).     Left rotator cuff repair (12/15/2015)           Revision total joint arthroplasty, left knee (11/17/2011)           Arthroscopy of knee (03/08/2010)            Comments: Left knee surgery/[place]..     Colonoscopy (07/15/2009)            Comments: Indication:  screening      Sedation:  Midazolam, Fentanyl--IV      Findings:  no polyps seen      Recommendation:  f/u 10yrs.     Replacement of left knee joint (01/08/2008)           Spermatocelectomy (09/17/2007)            Comments: Dr. Gallardo.  Medications    atorvastatin 80 mg oral tablet, 80 mg= 1 tab(s), Oral, daily    Daily Multiple Vitamins, 1 tab(s), Oral, daily    heated tubing, filters, nasal mask with cushion replacment, See Instructions    metoprolol succinate 25 mg oral tablet, extended release, 25 mg= 1 tab(s), Oral, daily    omeprazole 20 mg oral delayed release capsule, 20 mg= 1 cap(s), Oral, bid, 1 refills    sildenafil 100 mg oral tablet, 100 mg= 1 tab(s), Oral, daily, 2 refills  Allergies    ibuprofen (GI upset)  Social History    Smoking Status     Never smoker     Alcohol - Current      Current     Electronic Cigarette/Vaping      Electronic Cigarette Use: Never.     Employment/School      Work/School description: .     Exercise       Exercise type: Bicycling, back-strengthening exercises.     Home/Environment      Marital status:  (Living together). Spouse/Partner name: Darlene.     Substance Abuse      Never     Tobacco      Never (less than 100 in lifetime)  Family History    Heart disease: Mother and Father.    Hypercholesterolemia: Father.    Hypertension: Mother and Father.    Myocardial infarction: Mother.    Sleep apnea syndrome: Mother.  Immunizations      Vaccine Date Status          SARS-CoV-2 (COVID-19) Moderna-1273 03/12/2021 Recorded          influenza virus vaccine, inactivated 09/30/2020 Given          influenza virus vaccine, inactivated 11/18/2019 Given          influenza virus vaccine, inactivated 10/08/2018 Given          tetanus/diphth/pertuss (Tdap) adult/adol 10/03/2016 Given          influenza virus vaccine, inactivated 10/03/2016 Given          influenza virus vaccine, inactivated 11/20/2014 Given          influenza 11/19/2011 Recorded          Td 01/25/2006 Recorded          Td 02/24/2004 Recorded          Td 03/22/1995 Recorded

## 2022-02-16 NOTE — TELEPHONE ENCOUNTER
---------------------  From: Jenifer Yoder RN (Phone Messages Pool ([id number]))   To: sharing[employer]it Pool ([address] [place] [address]);     Sent: 9/23/2021 9:11:19 AM CDT  Subject: CONSUMER MESSAGE  FW: Sleep study           ---------------------  From: MAX RUVALCABA  To: [facility]  Sent: 09/23/2021 09:09 a.m. CDT  Subject: Sleep study  When I was in on the 21 st, I briefly talked about getting a sleep study redone. If I want to follow though with that , do I need to call to schedule an appointment? Do I need a referral from ? Thanks---------------------  From: Angelica Persaud MA ([employer] Message Pool ([facility]47[facility]))   To: Jeff Haider MD;     Sent: 9/23/2021 10:44:41 AM CDT  Subject: FW: CONSUMER MESSAGE  FW: Sleep study---------------------  From: Jeff Haider MD   To: [employer] Message Pool ([facility]);     Sent: 9/23/2021 6:08:49 PM CDT  Subject: RE: CONSUMER MESSAGE  FW: Sleep study     Advise a follow up with BLAIR or CLAY to discuss best next steps regarding ANNA---------------------  From: Angelica Persaud MA ([employer] Message Pool ([id number]))   To: MAX RUVALCABA    Sent: 9/23/2021 6:23:15 PM CDT  Subject: FW: CONSUMER MESSAGE  FW: Sleep study     Dr. Vitaly Ogden received your message.  Please call the clinic to schedule an appointment with either Dr. Bingham or Dr. Hernandez.    Sincerely,     Angelica BRYSON CMA

## 2022-02-16 NOTE — TELEPHONE ENCOUNTER
---------------------  From: Angelica Persaud MA ([employer] Pool ([id number]_[facility]))   To: [facility] Message Pool ([id number]_[place]);     Sent: 12/28/2021 10:11:50 AM CST  Subject: FW: Medication Management   Due Date/Time: 12/28/2021 9:17:00 AM CST             ** Patient matched by Angelica Persaud MA on 12/28/2021 10:11:45 AM CST **      ------------------------------------------  From: [facility]  To: Jeff Haider MD  Sent: December 26, 2021 9:17:28 AM CST  Subject: Medication Management  Due: December 15, 2021 8:20:29 PM CST     ** On Hold Pending Signature **     Drug: omeprazole (omeprazole 20 mg oral delayed release capsule), TAKE ONE CAPSULE BY MOUTH TWICE A DAY  Quantity: 180 cap(s)  Days Supply: 90  Refills: 0  Substitutions Allowed  Notes from Pharmacy:     Dispensed Drug: omeprazole (omeprazole 20 mg oral delayed release capsule), TAKE ONE CAPSULE BY MOUTH TWICE A DAY  Quantity: 180 cap(s)  Days Supply: 90  Refills: 1  Substitutions Allowed  Notes from Pharmacy:  ---------------------------------------------------------------  From: Angelica Persaud MA   To: [facility]    Sent: 12/28/2021 10:50:46 AM CST  Subject: FW: Medication Management     ** Submitted: **  Order:omeprazole (omeprazole 20 mg oral delayed release capsule)  1 cap(s)  Oral  bid  Qty:  180 cap(s)        Days Supply:  90        Refills:  1          Substitutions Allowed     Route To Pharmacy - [facility]    Signed by Angelica Persaud MA  12/28/2021 4:50:00 PM [facility]    ** Submitted: **  Complete:omeprazole (omeprazole 20 mg oral delayed release capsule)   Signed by Hung BRAXTON, Angelica  12/28/2021 4:50:00 PM [facility]    ** Not Approved:  **  omeprazole (OMEPRAZOLE 20MG CPDR)  TAKE ONE CAPSULE BY MOUTH TWICE A DAY  Qty:  180 cap(s)        Days Supply:  90        Refills:  1          Substitutions Allowed     Route To Pharmacy - [facility]   Signed  by Hung BRAXTON, Angelica

## 2022-02-16 NOTE — NURSING NOTE
Comprehensive Intake Entered On:  10/7/2019 11:40 AM CDT    Performed On:  10/7/2019 11:33 AM CDT by Margarita De LPN               Summary   Chief Complaint :   DOT Px, Sleep Machine chip.    Weight Measured :   275 lb(Converted to: 275 lb 0 oz, 124.74 kg)    Height Measured :   69.25 in(Converted to: 5 ft 9 in, 175.89 cm)    Body Mass Index :   40.31 kg/m2 (HI)    Body Surface Area :   2.47 m2   Systolic Blood Pressure :   126 mmHg   Diastolic Blood Pressure :   76 mmHg   Mean Arterial Pressure :   93 mmHg   Peripheral Pulse Rate :   70 bpm   BP Site :   Right arm   Pulse Site :   Radial artery   BP Method :   Manual   HR Method :   Manual   Temperature Tympanic :   98.1 DegF(Converted to: 36.7 DegC)    Margarita De LPN - 10/7/2019 11:33 AM CDT   Health Status   Allergies Verified? :   Yes   Medication History Verified? :   Yes   Pre-Visit Planning Status :   Completed   Margarita De LPN - 10/7/2019 11:33 AM CDT   Consents   Consent for Immunization Exchange :   Consent Granted   Consent for Immunizations to Providers :   Consent Granted   Margarita De LPN - 10/7/2019 11:33 AM CDT   Meds / Allergies   (As Of: 10/7/2019 11:40:49 AM CDT)   Allergies (Active)   ibuprofen  Estimated Onset Date:   Unspecified ; Reactions:   GI upset ; Created By:   Cheri Harris CMA; Reaction Status:   Active ; Category:   Drug ; Substance:   ibuprofen ; Type:   Intolerance ; Updated By:   Cheri Harris CMA; Reviewed Date:   10/7/2019 11:37 AM CDT        Medication List   (As Of: 10/7/2019 11:40:49 AM CDT)   Prescription/Discharge Order    lisinopril  :   lisinopril ; Status:   Prescribed ; Ordered As Mnemonic:   lisinopril 10 mg oral tablet ; Simple Display Line:   10 mg, 1 tab(s), Oral, daily, 90 tab(s), 3 Refill(s) ; Ordering Provider:   Jeff Haider MD; Catalog Code:   lisinopril ; Order Dt/Tm:   10/8/2018 10:37:58 AM CDT          sildenafil  :   sildenafil ; Status:   Prescribed ; Ordered As  Mnemonic:   sildenafil 50 mg oral tablet ; Simple Display Line:   50 mg, 1 tab(s), Oral, daily, 15 tab(s), 0 Refill(s) ; Ordering Provider:   Jeff Haider MD; Catalog Code:   sildenafil ; Order Dt/Tm:   10/8/2018 10:36:11 AM CDT          omeprazole  :   omeprazole ; Status:   Prescribed ; Ordered As Mnemonic:   omeprazole 20 mg oral delayed release capsule ; Simple Display Line:   20 mg, 1 cap(s), PO, Daily, 90 cap(s), 3 Refill(s) ; Ordering Provider:   Jeff Haider MD; Catalog Code:   omeprazole ; Order Dt/Tm:   10/8/2018 10:35:25 AM CDT          Miscellaneous Rx Supply  :   Miscellaneous Rx Supply ; Status:   Prescribed ; Ordered As Mnemonic:   heated tubing, filters, nasal mask with cushion replacment ; Simple Display Line:   See Instructions, use as directed, 1 EA, 0 Refill(s) ; Ordering Provider:   Jeff Haider MD; Catalog Code:   Miscellaneous Rx Supply ; Order Dt/Tm:   10/11/2016 5:53:33 PM CDT ; Comment:   Months = 99 (Lifetime)            Home Meds    multivitamin  :   multivitamin ; Status:   Documented ; Ordered As Mnemonic:   Daily Multiple Vitamins ; Simple Display Line:   1 tab(s), po, daily, 0 Refill(s) ; Catalog Code:   multivitamin ; Order Dt/Tm:   10/3/2016 12:37:06 PM CDT            Vision Testing POC   Corrective Lenses :   None   Eye, Left Visual Acuity :   20/20   Eye, Right Visual Acuity :   20/25   Eye, Bilateral Visual Acuity :   20/20   Margarita De LPN - 10/7/2019 11:33 AM CDT

## 2022-02-16 NOTE — TELEPHONE ENCOUNTER
---------------------  From: Lizzy Reyna CMA   To: MAX RUVALCABA    Sent: 10/2/2020 11:54:32 AM CDT  Subject: Portal Message     [place],    Your A1C level was not check at your recent visit.  Let us know if you have further questions,    Lizzy MAI CMA

## 2022-02-16 NOTE — TELEPHONE ENCOUNTER
---------------------  From: Jeff Haider MD   To: MAX RUVALCABA    Sent: 9/22/2021 9:36:47 PM CDT  Subject: Patient Message - Results Notification      Your tests look good.      Results:  Date Result Name Ind Value Ref Range   9/21/2021 11:09 AM Glucose Level ((H)) 101 mg/dL (65 - 99)   9/21/2021 11:09 AM BUN  15 mg/dL (7 - 25)   9/21/2021 11:09 AM Creatinine Level  0.88 mg/dL (0.70 - 1.25)   9/21/2021 11:09 AM eGFR  90 mL/min/1.73m2 (> OR = 60 - )   9/21/2021 11:09 AM eGFR African American  104 mL/min/1.73m2 (> OR = 60 - )   9/21/2021 11:09 AM BUN/Creat Ratio  NOT APPLICABLE (6 - 22)   9/21/2021 11:09 AM Sodium Level  138 mmol/L (135 - 146)   9/21/2021 11:09 AM Potassium Level  4.9 mmol/L (3.5 - 5.3)   9/21/2021 11:09 AM Chloride Level  102 mmol/L (98 - 110)   9/21/2021 11:09 AM CO2 Level  29 mmol/L (20 - 32)   9/21/2021 11:09 AM Calcium Level  9.7 mg/dL (8.6 - 10.3)   9/21/2021 11:09 AM Cholesterol  133 mg/dL ( - <200)   9/21/2021 11:09 AM HDL ((L)) 38 mg/dL (> OR = 40 - )   9/21/2021 11:09 AM Triglyceride  121 mg/dL ( - <150)   9/21/2021 11:09 AM LDL  75    9/21/2021 11:09 AM Cholesterol/HDL Ratio  3.5 ( - <5.0)   9/21/2021 11:09 AM Non-HDL Cholesterol  95 ( - <130)

## 2022-02-16 NOTE — TELEPHONE ENCOUNTER
---------------------  From: Jeff Haider MD   To: [facility] Message Pool ([id number]_[place] - [place]);     Sent: 8/11/2020 1:27:43 PM CDT  Subject: General Message     Will need to set up CXR, BMP and CBC with diffLVM for patient to call and set up Lab visit and can do X-ray at that time.

## 2022-02-16 NOTE — PROGRESS NOTES
Patient:   MAX RUVALCABA            MRN: [id number]            FIN: [id number]               Age:   62 years     Sex:  Male     :  1955   Associated Diagnoses:   Encounter for examination required by Department of Transportation (DOT)   Author:   Jeff Haider MD      Results Review   General results   Today's results   10/18/2017 11:08 AM CDT Height Measured - Standard 69.25 in    Weight Measured - Standard 276.6 lb    BSA 2.47 m2    Body Mass Index 40.55 kg/m2    Temperature Tympanic 97.4 DegF  LOW    Peripheral Pulse Rate 72 bpm    Pulse Site Radial artery    HR Method Manual    Systolic Blood Pressure 128 mmHg    Diastolic Blood Pressure 86 mmHg    Mean Arterial Pressure 100 mmHg    BP Site Right arm    BP Method Manual    Allergies Verified? Yes    Medication History Verified? Yes    Medical History Verified? Yes    Tobacco Use? Never smoker    Pre-Visit Planning Status Completed    Chief Complaint DOT px.   also needs refills on meds.   also c/o ongoing tailbone pain after falling on it 1.5yrs ago.    Comprehensive Intake Form Comprehensive Intake Form    Intake Form Comprehensive Intake         Health Status   Allergies:    Allergic Reactions (Selected)  No Known Medication Allergies   Medications:  (Selected)   Prescriptions  Prescribed  heated tubing, filters, nasal mask with cushion replacment: heated tubing, filters, nasal mask with cushion replacment, See Instructions, Instructions: use as directed, Supply, # 1 EA, 0 Refill(s), Type: Maintenance, other reason (Rx)  lisinopril 10 mg oral tablet: 1 tab(s) ( 10 mg ), PO, Daily, # 90 tab(s), 3 Refill(s), Type: Maintenance, Pharmacy: [id number], 1 tab(s) po daily  Documented Medications  Documented  Daily Multiple Vitamins: 1 tab(s), po, daily, 0 Refill(s), Type: Maintenance  Prilosec OTC 20 mg oral enteric coated tablet: 1 tab(s) ( 20 mg ), po, daily, 0 Refill(s), Type: Maintenance   Problem list:    All Problems  Chronic heartburn /  ICD-9-.1 / Confirmed  Obesity / ICD-9-.00 / Probable  ANNA on CPAP / ICD-9-.23 / Confirmed  Osteoarthritis of Knee / ICD-9-.96 / Confirmed  Osteoarthritis of right shoulder / SNOMED CT 396319613 / Confirmed  Resolved: Rotator cuff tear / SNOMED CT 4188984151      Subjective   Problem:  Please see scanned in copy of DOT physical      Objective   Vital Signs   10/18/2017 11:08 AM CDT Temperature Tympanic 97.4 DegF  LOW    Peripheral Pulse Rate 72 bpm    Pulse Site Radial artery    HR Method Manual    Systolic Blood Pressure 128 mmHg    Diastolic Blood Pressure 86 mmHg    Mean Arterial Pressure 100 mmHg    BP Site Right arm    BP Method Manual      Measurements from flowsheet : Measurements   10/18/2017 11:08 AM CDT Height Measured - Standard 69.25 in    Weight Measured - Standard 276.6 lb    BSA 2.47 m2    Body Mass Index 40.55 kg/m2      General:  Alert and oriented, No acute distress.    Eye:  Pupils are equal, round and reactive to light, Extraocular movements are intact, Normal conjunctiva.         Visual fields: Full to confrontation both eyes.    HENT:  Normocephalic, Tympanic membranes are clear, Normal hearing, Oral mucosa is moist, No pharyngeal erythema, No sinus tenderness.    Neck:  Supple, Non-tender, No lymphadenopathy.    Respiratory:  Lungs are clear to auscultation, Respirations are non-labored.    Cardiovascular:  Normal rate, Regular rhythm.    Gastrointestinal:  Soft, Non-tender, No organomegaly.    Musculoskeletal:  Normal range of motion, Normal strength, No tenderness, scar from knee replacement.    Integumentary:  Warm, Dry.    Neurologic:  Alert, Oriented.    Psychiatric:  Cooperative, Appropriate mood & affect.       Assessment   Interpretation of Results   Laboratory: UA negative.        Plan   Impression and Plan:  Orders   .    Diagnosis     Encounter for examination required by Department of Transportation (DOT) (CPY68-ZD Z02.89).     .    Condition stable, and  certification approved for one year  consistent use of CPAP.

## 2022-02-16 NOTE — NURSING NOTE
Comprehensive Intake Entered On:  8/5/2019 8:29 AM CDT    Performed On:  8/5/2019 8:25 AM CDT by Cheri Harris CMA               Summary   Chief Complaint :   Colonoscopy consult   Weight Measured :   276.4 lb(Converted to: 276 lb 6 oz, 125.37 kg)    Height Measured :   69.25 in(Converted to: 5 ft 9 in, 175.89 cm)    Body Mass Index :   40.52 kg/m2 (HI)    Body Surface Area :   2.47 m2   Systolic Blood Pressure :   128 mmHg   Diastolic Blood Pressure :   76 mmHg   Mean Arterial Pressure :   93 mmHg   Peripheral Pulse Rate :   78 bpm   BP Site :   Right arm   Pulse Site :   Radial artery   BP Method :   Manual   HR Method :   Manual   Temperature Tympanic :   97.5 DegF(Converted to: 36.4 DegC)  (LOW)    Cheri Harris CMA - 8/5/2019 8:25 AM CDT   Health Status   Allergies Verified? :   Yes   Medication History Verified? :   Yes   Medical History Verified? :   No   Pre-Visit Planning Status :   Completed   Tobacco Use? :   Never smoker   Cheri Harris CMA - 8/5/2019 8:25 AM CDT   Meds / Allergies   (As Of: 8/5/2019 8:29:31 AM CDT)   Allergies (Active)   ibuprofen  Estimated Onset Date:   Unspecified ; Reactions:   GI upset ; Created By:   Cheri Harris CMA; Reaction Status:   Active ; Category:   Drug ; Substance:   ibuprofen ; Type:   Intolerance ; Updated By:   Cheri Harris CMA; Reviewed Date:   8/5/2019 8:28 AM CDT        Medication List   (As Of: 8/5/2019 8:29:31 AM CDT)   Prescription/Discharge Order    lisinopril  :   lisinopril ; Status:   Prescribed ; Ordered As Mnemonic:   lisinopril 10 mg oral tablet ; Simple Display Line:   10 mg, 1 tab(s), Oral, daily, 90 tab(s), 3 Refill(s) ; Ordering Provider:   Jeff Haider MD; Catalog Code:   lisinopril ; Order Dt/Tm:   10/8/2018 10:37:58 AM          Miscellaneous Rx Supply  :   Miscellaneous Rx Supply ; Status:   Prescribed ; Ordered As Mnemonic:   heated tubing, filters, nasal mask with cushion replacment ; Simple Display Line:   See Instructions, use as  directed, 1 EA, 0 Refill(s) ; Ordering Provider:   Jeff Haider MD; Catalog Code:   Miscellaneous Rx Supply ; Order Dt/Tm:   10/11/2016 5:53:33 PM ; Comment:   Months = 99 (Lifetime)          omeprazole  :   omeprazole ; Status:   Prescribed ; Ordered As Mnemonic:   omeprazole 20 mg oral delayed release capsule ; Simple Display Line:   20 mg, 1 cap(s), PO, Daily, 90 cap(s), 3 Refill(s) ; Ordering Provider:   Jeff Haider MD; Catalog Code:   omeprazole ; Order Dt/Tm:   10/8/2018 10:35:25 AM          sildenafil  :   sildenafil ; Status:   Prescribed ; Ordered As Mnemonic:   sildenafil 50 mg oral tablet ; Simple Display Line:   50 mg, 1 tab(s), Oral, daily, 15 tab(s), 0 Refill(s) ; Ordering Provider:   Jeff Haider MD; Catalog Code:   sildenafil ; Order Dt/Tm:   10/8/2018 10:36:11 AM            Home Meds    multivitamin  :   multivitamin ; Status:   Documented ; Ordered As Mnemonic:   Daily Multiple Vitamins ; Simple Display Line:   1 tab(s), po, daily, 0 Refill(s) ; Catalog Code:   multivitamin ; Order Dt/Tm:   10/3/2016 12:37:06 PM

## 2022-02-16 NOTE — NURSING NOTE
Comprehensive Intake Entered On:  12/14/2020 9:30 AM CST    Performed On:  12/14/2020 9:25 AM CST by Margarita De LPN               Summary   Chief Complaint :   HTN, Medication check and refills. right hip pain increasing.    Weight Measured :   276 lb(Converted to: 276 lb 0 oz, 125.191 kg)    Height Measured :   69.25 in(Converted to: 5 ft 9 in, 175.89 cm)    Body Mass Index :   40.46 kg/m2 (HI)    Body Surface Area :   2.47 m2   Systolic Blood Pressure :   136 mmHg (HI)    Diastolic Blood Pressure :   82 mmHg (HI)    Mean Arterial Pressure :   100 mmHg   Peripheral Pulse Rate :   72 bpm   BP Site :   Left arm   Pulse Site :   Radial artery   BP Method :   Manual   HR Method :   Manual   Temperature Tympanic :   97.1 DegF(Converted to: 36.2 DegC)  (LOW)    Margarita De LPN - 12/14/2020 9:25 AM CST   Health Status   Allergies Verified? :   Yes   Medication History Verified? :   Yes   Pre-Visit Planning Status :   Completed   Tobacco Use? :   Never smoker   Margarita De LPN - 12/14/2020 9:25 AM CST   Consents   Consent for Immunization Exchange :   Consent Granted   Consent for Immunizations to Providers :   Consent Granted   Margarita De LPN - 12/14/2020 9:25 AM CST   Meds / Allergies   (As Of: 12/14/2020 9:30:48 AM CST)   Allergies (Active)   ibuprofen  Estimated Onset Date:   Unspecified ; Reactions:   GI upset ; Created By:   Cheri Harris CMA; Reaction Status:   Active ; Category:   Drug ; Substance:   ibuprofen ; Type:   Intolerance ; Updated By:   Cheri Harris CMA; Reviewed Date:   8/11/2020 1:06 PM CDT        Medication List   (As Of: 12/14/2020 9:30:48 AM CST)   Prescription/Discharge Order    lisinopril  :   lisinopril ; Status:   Prescribed ; Ordered As Mnemonic:   lisinopril 10 mg oral tablet ; Simple Display Line:   10 mg, 1 tab(s), Oral, daily, 30 tab(s), 0 Refill(s) ; Ordering Provider:   Jeff Haider MD; Catalog Code:   lisinopril ; Order Dt/Tm:   11/30/2020 12:03:52  PM CST          Miscellaneous Rx Supply  :   Miscellaneous Rx Supply ; Status:   Prescribed ; Ordered As Mnemonic:   heated tubing, filters, nasal mask with cushion replacment ; Simple Display Line:   See Instructions, use as directed, 1 EA, 0 Refill(s) ; Ordering Provider:   Jeff Haider MD; Catalog Code:   Miscellaneous Rx Supply ; Order Dt/Tm:   10/11/2016 5:53:33 PM CDT ; Comment:   Months = 99 (Lifetime)          omeprazole  :   omeprazole ; Status:   Prescribed ; Ordered As Mnemonic:   omeprazole 20 mg oral delayed release capsule ; Simple Display Line:   20 mg, 1 cap(s), Oral, daily, 30 cap(s), 0 Refill(s) ; Ordering Provider:   Jeff Haider MD; Catalog Code:   omeprazole ; Order Dt/Tm:   11/30/2020 12:04:27 PM CST          sildenafil  :   sildenafil ; Status:   Prescribed ; Ordered As Mnemonic:   sildenafil 100 mg oral tablet ; Simple Display Line:   100 mg, 1 tab(s), Oral, daily, 30 tab(s), 2 Refill(s) ; Ordering Provider:   Jeff Haider MD; Catalog Code:   sildenafil ; Order Dt/Tm:   11/18/2019 11:49:27 AM CST            Home Meds    carbonyl iron  :   carbonyl iron ; Status:   Documented ; Ordered As Mnemonic:   Iron Chews ; Simple Display Line:   65 mg, Oral, daily, 0 Refill(s) ; Catalog Code:   carbonyl iron ; Order Dt/Tm:   9/30/2020 9:11:28 AM CDT          multivitamin  :   multivitamin ; Status:   Documented ; Ordered As Mnemonic:   Daily Multiple Vitamins ; Simple Display Line:   1 tab(s), po, daily, 0 Refill(s) ; Catalog Code:   multivitamin ; Order Dt/Tm:   10/3/2016 12:37:06 PM CDT            ID Risk Screen   Recent Travel History :   No recent travel   Family Member Travel History :   No recent travel   Other Exposure to Infectious Disease :   Unknown   Margarita De LPN - 12/14/2020 9:25 AM CST   Social History   Social History   (As Of: 12/14/2020 9:30:48 AM CST)   Alcohol:  Current      Current   (Last Updated: 11/29/2011 12:20:21 PM CST by Zarina Larsen           Tobacco:        Never (less than 100 in lifetime)   (Last Updated: 12/14/2020 9:25:30 AM CST by Margarita De LPN)          Electronic Cigarette/Vaping:        Electronic Cigarette Use: Never.   (Last Updated: 12/14/2020 9:25:34 AM CST by Margarita De LPN)          Substance Abuse:        Never   (Last Updated: 11/29/2011 12:20:37 PM CST by Angelica Larsen)          Employment/School:        Work/School description: .   (Last Updated: 11/29/2011 12:29:46 PM CST by Angelica Larsen)          Home/Environment:        Marital status:  (Living together).  Spouse/Partner name: Darlene.   (Last Updated: 11/29/2011 12:30:55 PM CST by Angelica Larsen)          Exercise:        Exercise type: Exercise with farming..   (Last Updated: 11/29/2011 12:26:33 PM CST by Angelica Larsen)   Exercise type: Bicycling, back-strengthening exercises.   (Last Updated: 11/29/2011 12:37:24 PM CST by Angelica Larsen)

## 2022-02-16 NOTE — TELEPHONE ENCOUNTER
---------------------  From: Jeff Haider MD   To: MAX RUVALCABA    Sent: 9/22/2021 9:36:11 PM CDT  Subject: Patient Message - Results Notification            Results:  Date Result Name Ind Value Ref Range   9/21/2021 11:09 AM Glucose Level ((H)) 101 mg/dL (65 - 99)   9/21/2021 11:09 AM BUN  15 mg/dL (7 - 25)   9/21/2021 11:09 AM Creatinine Level  0.88 mg/dL (0.70 - 1.25)   9/21/2021 11:09 AM eGFR  90 mL/min/1.73m2 (> OR = 60 - )   9/21/2021 11:09 AM eGFR African American  104 mL/min/1.73m2 (> OR = 60 - )   9/21/2021 11:09 AM BUN/Creat Ratio  NOT APPLICABLE (6 - 22)   9/21/2021 11:09 AM Sodium Level  138 mmol/L (135 - 146)   9/21/2021 11:09 AM Potassium Level  4.9 mmol/L (3.5 - 5.3)   9/21/2021 11:09 AM Chloride Level  102 mmol/L (98 - 110)   9/21/2021 11:09 AM CO2 Level  29 mmol/L (20 - 32)   9/21/2021 11:09 AM Calcium Level  9.7 mg/dL (8.6 - 10.3)   9/21/2021 11:09 AM Cholesterol  133 mg/dL ( - <200)   9/21/2021 11:09 AM HDL ((L)) 38 mg/dL (> OR = 40 - )   9/21/2021 11:09 AM Triglyceride  121 mg/dL ( - <150)   9/21/2021 11:09 AM LDL  75    9/21/2021 11:09 AM Cholesterol/HDL Ratio  3.5 ( - <5.0)   9/21/2021 11:09 AM Non-HDL Cholesterol  95 ( - <130)

## 2022-02-16 NOTE — TELEPHONE ENCOUNTER
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   Your labs are up to date but you are due to see Dr. Haider for a hypertension check to get your medications renewed.    Thanks,  Bisi DECKER LPN      ---------------------  From: MAX RUVALCABA  To: [facility]  Sent: 12/01/2020 01:37 p.m. CST  Subject: Prescription refill to Dr Haider  I went to refill my blood pressure medication and I could only get a 30 day supply instead of the usual 90 day. They said that I need doctors approval. I received a letter from [facility] a couple weeks ago that said I needed current labs. I was in and saw Dr Haider the 1st week of October and had blood pressure checked and had labs done. I shouldn t need to come in again. Do I need to have labs done again?

## 2022-03-02 NOTE — TELEPHONE ENCOUNTER
---------------------  From: Bisi Cespedes LPN (Phone Messages Pool ([id number]_[facility]))   Sent: 1/26/2022 2:19:24 PM CST  Subject: [facility] call     Phone Message    PCP:   RICKEY      Time of Call:  2:15pm       Person Calling:  Jumana- Respiratory Therapist at [facility]     Note:   Jumana calling to see where pt gets his CPAP supplies. Most recent from 2017 looks like pt using [employer]. Jumana says she will call and see if they have pt's information.    Last office visit and reason:  9/21/21 DOT PE

## 2022-04-13 PROBLEM — R12 HEARTBURN: Status: ACTIVE | Noted: 2022-04-13

## 2022-04-13 PROBLEM — E66.9 OBESITY: Status: ACTIVE | Noted: 2022-04-13

## 2022-04-13 PROBLEM — I10 HYPERTENSION: Status: ACTIVE | Noted: 2022-04-13

## 2022-04-13 RX ORDER — METOPROLOL SUCCINATE 25 MG/1
TABLET, EXTENDED RELEASE ORAL
COMMUNITY
Start: 2021-03-16 | End: 2022-04-14

## 2022-04-13 RX ORDER — LISINOPRIL 30 MG/1
30 TABLET ORAL
COMMUNITY
Start: 2022-01-26

## 2022-04-14 ENCOUNTER — OFFICE VISIT (OUTPATIENT)
Dept: FAMILY MEDICINE | Facility: CLINIC | Age: 67
End: 2022-04-14
Payer: MEDICARE

## 2022-04-14 VITALS
DIASTOLIC BLOOD PRESSURE: 80 MMHG | SYSTOLIC BLOOD PRESSURE: 134 MMHG | TEMPERATURE: 98.2 F | WEIGHT: 288 LBS | OXYGEN SATURATION: 98 % | HEART RATE: 69 BPM | BODY MASS INDEX: 41.32 KG/M2

## 2022-04-14 DIAGNOSIS — M25.571 PAIN IN JOINT, ANKLE AND FOOT, RIGHT: ICD-10-CM

## 2022-04-14 DIAGNOSIS — S29.011A MUSCLE STRAIN OF ANTERIOR CHEST WALL: Primary | ICD-10-CM

## 2022-04-14 PROBLEM — E66.01 MORBID OBESITY (H): Status: ACTIVE | Noted: 2022-04-14

## 2022-04-14 PROCEDURE — 93000 ELECTROCARDIOGRAM COMPLETE: CPT | Performed by: FAMILY MEDICINE

## 2022-04-14 PROCEDURE — 99213 OFFICE O/P EST LOW 20 MIN: CPT | Performed by: FAMILY MEDICINE

## 2022-04-14 NOTE — PROGRESS NOTES
"  Assessment & Plan     Muscle strain of anterior chest wall  Pain appears to be emanating from the chest wall.  Pain is reproducible.  His ECG is normal with normal sinus rhythm, no evidence of ectopy or ischemia.  He will continue with NSAIDs as needed, rest, and follow-up if symptoms or not improving.  - EKG 12-lead complete w/read - Clinics    Pain in joint, ankle and foot, right  Right ankle sprain, slowly improving  Ankle rehab and bracing discussed.  Follow-up if not improving over the next couple of weeks               BMI:   Estimated body mass index is 41.32 kg/m  as calculated from the following:    Height as of 9/29/21: 1.778 m (5' 10\").    Weight as of this encounter: 130.6 kg (288 lb).           No follow-ups on file.    Jeff Haider MD  [facility] JENNIFER Luciano is a 66 year old who presents for the following health issues: bypass heart surgery about one year ago, has been having pain down the center of the chest and around to the left side x1 week, also having right ankle pain, occurred while using a walking workout machine     Musculoskeletal Problem    History of Present Illness       Reason for visit:  Pain in chest on left side above rib cage. Also right ankle sprain.  Symptom onset:  3-7 days ago  Symptoms include:  Pain between left breast area and ribs.  Symptom intensity:  Mild  Symptom progression:  Staying the same  Had these symptoms before:  No  What makes it worse:  No  What makes it better:  Laying down    He eats 2-3 servings of fruits and vegetables daily.He consumes 0 sweetened beverage(s) daily. He exercises with enough effort to increase his heart rate 3 or less days per week.   He is taking medications regularly.             Review of Systems   Constitutional, HEENT, cardiovascular, pulmonary, gi and gu systems are negative, except as otherwise noted.      Objective    /80 (BP Location: Right arm, Patient Position: Sitting)   Pulse " 69   Temp 98.2  F (36.8  C)   Wt 130.6 kg (288 lb)   SpO2 98%   BMI 41.32 kg/m    Body mass index is 41.32 kg/m .  Physical Exam   GENERAL: healthy, alert and no distress  NECK: no adenopathy, no asymmetry, masses  RESP: lungs clear to auscultation - no rales, rhonchi or wheezes, left sternal and left lateral chest wall tenderness to palpation  CV: regular rate and rhythm, normal S1 S2, no S3 or S4, no murmur, click or rub, no peripheral edema and peripheral pulses strong  ABDOMEN: soft, nontender, no hepatosplenomegaly, no masses and bowel sounds normal  MS: RLE exam shows anterolateral right ankle tenderness, no instability, no malleolar tenderness    EKG - Reviewed and interpreted by me appears normal, NSR, normal axis, normal intervals, no acute ST/T changes c/w ischemia, no LVH by voltage criteria, unchanged from previous tracings

## 2022-06-20 ENCOUNTER — OFFICE VISIT (OUTPATIENT)
Dept: CARDIOLOGY | Facility: CLINIC | Age: 67
End: 2022-06-20
Payer: MEDICARE

## 2022-06-20 VITALS
OXYGEN SATURATION: 95 % | BODY MASS INDEX: 39.64 KG/M2 | HEART RATE: 76 BPM | DIASTOLIC BLOOD PRESSURE: 70 MMHG | RESPIRATION RATE: 18 BRPM | WEIGHT: 276.3 LBS | SYSTOLIC BLOOD PRESSURE: 114 MMHG

## 2022-06-20 DIAGNOSIS — I25.10 CORONARY ARTERY DISEASE INVOLVING NATIVE CORONARY ARTERY OF NATIVE HEART WITHOUT ANGINA PECTORIS: Primary | ICD-10-CM

## 2022-06-20 PROCEDURE — 99214 OFFICE O/P EST MOD 30 MIN: CPT | Performed by: INTERNAL MEDICINE

## 2022-06-20 RX ORDER — MULTIVIT WITH MINERALS/LUTEIN
1 TABLET ORAL DAILY
COMMUNITY

## 2022-06-20 NOTE — PROGRESS NOTES
Thank you, Dr. Gibbs, for asking [facility] to evaluate Mr. Mustapha Carrero.      Assessment/Recommendations   Assessment:    Preop cardiac evaluation prior to knee replacement-low risk of cardiac complications of surgery  Coronary artery disease status post coronary artery bypass graft surgery in 2021, no angina  Hypertension, good control  Hypercholesterolemia on statin    Plan:  He appears to be well compensated from cardiac standpoint.  He has no cardiac symptoms and he had recent complete  surgical coronary revascularization.  He is appropriate candidate for surgery.  No special cardiac precautions need to be undertaken.       History of Present Illness    Mr. Mustapha Carrero is a 67 year old male who comes in for preop cardiac evaluation.  He has DJD of right knee.  He is scheduled to undergo replacement at [facility] next week.  The surgical team requested cardiology evaluation.  In 2021 he was experience exertional angina.  He had coronary angiogram and then coronary artery bypass graft surgery.  He is uneventful recovery.  He denies any exertional chest pain or shortness of breath.  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  He has not had heart palpitations syncope.  He denies weight gain, PND, orthopnea.  He is compliant with the medications    ECG: Personally reviewed.  April 2022 normal sinus rhythm no ischemic changes  Echocardiogram 23 March 2021:  1. Normal left ventricular size and systolic performance with ejection fraction of 55 to 60%.  2. No significant valvular heart disease.  3. Normal right ventricular size and systolic performance.  4. Normal atrial dimensions.  5. Mild aortic root enlargement.     Coronary angiogram 23 March 2021 (precoronary artery bypass graft surgery):  1. Left main coronary artery: No significant stenosis.  2. Left anterior descending coronary artery: 50% mid vessel stenosis.  3. Circumflex coronary artery: Small vessel with  proximal-mid 50% stenosis.  4. Right coronary artery: Mid 100% stenosis with subsequent 80% stenosis.  Right PDA with 100% stenosis.  Distal PDA fills faintly from collaterals from bridging collaterals.  5. Left ventriculography: Normal left ventricular systolic performance without wall motion abnormality.     Stress echocardiogram 5 March 2021 (precoronary angiogram):  1. Abnormal stress echocardiogram with evidence of post exercise hypokinesis involving the mid anterolateral, apical lateral and apex. All other segments are normal.  2. Normal baseline left ventricular systolic performance with ejection fraction of 55%.  3. The stress electrocardiogram was abnormal with 1 mm of horizontal ST segment depression in leads II, III, aVF, and V3-6, leads, and returning to baseline after 10 minutes.  4. No chest pain symptoms reported during test.  5. Normal exercise capacity for age/gender.     Ambulatory monitor May 2021:  1. No concerning rhythm disturbances.     Bilateral carotid ultrasound 23 March 2021:  1. Mild plaque formation, velocities consistent with less than 50% stenosis in the right internal carotid artery.  2. Mild plaque formation, velocities consistent with less than 50% stenosis in the left internal carotid artery.  3. Flow within the vertebral arteries is antegrade.       Physical Examination Review of Systems   /70 (BP Location: Left arm, Patient Position: Sitting, Cuff Size: Adult Large)   Pulse 76   Resp 18   Wt 125.3 kg (276 lb 4.8 oz)   SpO2 95%   BMI 39.64 kg/m    Body mass index is 39.64 kg/m .  Wt Readings from Last 3 Encounters:   06/20/22 125.3 kg (276 lb 4.8 oz)   04/14/22 130.6 kg (288 lb)   09/29/21 122.4 kg (269 lb 14.4 oz)     General Appearance:   Alert, cooperative, no distress, appears stated age   Head/ENT: Normocephalic, without obvious abnormality. Membranes moist      EYES:  no scleral icterus, normal conjunctivae   Neck: Supple, symmetrical, trachea midline, no  adenopathy, thyroid: not enlarged, symmetric, no carotid bruit or JVD   Chest/Lungs:   Lungs are clear to auscultation, respirations unlabored. No tenderness or deformity    Cardiovascular:   Regular rhythm, S1, S2 normal, no murmur, rub or gallop.   Abdomen:  Soft, non-tender, bowel sounds active all four quadrants,  no masses, no organomegaly   Extremities: no cyanosis or clubbing. No edema   Skin: Skin color, texture, turgor normal, no rashes or lesions.    Psychiatric: Normal affect, calm   Neurologic: Alert and oriented x 3, moving all four extremities.     Enc Vitals  BP: 114/70  Pulse: 76  Resp: 18  SpO2: 95 %  Weight: 125.3 kg (276 lb 4.8 oz)                                          Lab Results    Chemistry/lipid CBC Cardiac Enzymes/BNP/TSH/INR   Recent Labs   Lab Test 09/21/21  1109   CHOL 133   HDL 38*   LDL 75   TRIG 121   CHOLHDLRATIO 3.5     Recent Labs   Lab Test 09/21/21  1109 03/23/21  0752 09/30/20  1000   LDL 75 71 128*     Recent Labs   Lab Test 09/21/21  1109      POTASSIUM 4.9   CHLORIDE 102   CO2 29   *   BUN 15   CR 0.88   GFRESTIMATED 90   CHRISTOPHER 9.7     Recent Labs   Lab Test 09/21/21  1109 03/29/21  0555 03/27/21  0450   CR 0.88 0.94 0.89     Recent Labs   Lab Test 03/24/21  1219 11/18/19  1201   A1C 5.5 5.7*          Recent Labs   Lab Test 03/29/21  0555   WBC 10.7   HGB 10.5*   HCT 32.4*   MCV 84        Recent Labs   Lab Test 03/29/21  0555 03/27/21  0450 03/26/21  0419   HGB 10.5* 9.9* 11.6*    No results for input(s): TROPONINI in the last 32019 hours.  No results for input(s): BNP, NTBNPI, NTBNP in the last 82060 hours.  No results for input(s): TSH in the last 33556 hours.  Recent Labs   Lab Test 03/26/21  0419 03/25/21  1851 03/25/21  1733   INR 1.27* 1.22* 1.38*        Medical History  Surgical History Family History Social History   Past Medical History:   Diagnosis Date     Dyslipidemia      GERD (gastroesophageal reflux disease)      Heart disease 3/25/2021     Had double bypass     Hypertension      ANNA (obstructive sleep apnea)      Osteoarthritis      Past Surgical History:   Procedure Laterality Date     ARTHROPLASTY KNEE Left      ARTHROSCOPY SHOULDER ROTATOR CUFF REPAIR       CARDIAC SURGERY  3/25/2021     COLONOSCOPY       CV CORONARY ANGIOGRAM N/A 03/23/2021    Procedure: Coronary Angiogram;  Surgeon: Byron Shanks MD;  Location: [facility] Cardiac Cath Lab;  Service: Cardiology     CV LEFT HEART CATHETERIZATION WITH LEFT VENTRICULOGRAM N/A 03/23/2021    Procedure: Left Heart Catheterization with Left Ventriculogram;  Surgeon: Byron Shanks MD;  Location: [facility] Cardiac Cath Lab;  Service: Cardiology     No premature CAD, SCD,cardiomyopathy   Social History     Socioeconomic History     Marital status:      Spouse name: Not on file     Number of children: Not on file     Years of education: Not on file     Highest education level: Not on file   Occupational History     Not on file   Tobacco Use     Smoking status: Never Smoker     Smokeless tobacco: Never Used   Vaping Use     Vaping Use: Never used   Substance and Sexual Activity     Alcohol use: Yes     Comment: Alcoholic Drinks/day: occasional     Drug use: Never     Sexual activity: Not Currently     Partners: Female   Other Topics Concern     Parent/sibling w/ CABG, MI or angioplasty before 65F 55M? No   Social History Narrative     Not on file     Social Determinants of Health     Financial Resource Strain: Not on file   Food Insecurity: Not on file   Transportation Needs: Not on file   Physical Activity: Not on file   Stress: Not on file   Social Connections: Not on file   Intimate Partner Violence: Not on file   Housing Stability: Not on file         Medications  Allergies   Current Outpatient Medications   Medication Sig Dispense Refill     acetaminophen (TYLENOL) 325 MG tablet [ACETAMINOPHEN (TYLENOL) 325 MG TABLET] Take 2 tablets (650 mg total) by mouth every 4 (four) hours as needed.   0     amLODIPine (NORVASC) 5 MG tablet Take 1 tablet (5 mg) by mouth daily 30 tablet 11     aspirin 81 mg chewable tablet [ASPIRIN 81 MG CHEWABLE TABLET] Chew 2 tablets (162 mg total) daily.  0     lisinopril (ZESTRIL) 30 MG tablet Take 30 mg by mouth       multivitamin (CENTRUM SILVER) tablet Take 1 tablet by mouth daily       omeprazole (PRILOSEC) 20 MG capsule [OMEPRAZOLE (PRILOSEC) 20 MG CAPSULE] Take 20 mg by mouth daily before breakfast.       OTHER MEDICAL SUPPLIES   CPAP supplies, mask and tubing, See Instructions, Instructions: use nightly, Supply, # 1 EA, 0 Refill(s), Type: Maintenance, other reason (Rx), use nightly       rosuvastatin (CRESTOR) 5 MG tablet Take 1 tablet (5 mg) by mouth At Bedtime 90 tablet 1     sildenafiL (VIAGRA) 100 MG tablet [SILDENAFIL (VIAGRA) 100 MG TABLET] Take 100 mg by mouth daily as needed for erectile dysfunction.          Allergies   Allergen Reactions     Ibuprofen      Other reaction(s): GI intolerance     Nsaids GI Disturbance

## 2022-06-20 NOTE — LETTER
6/20/2022    Jeff Haider MD  [address] [facility] [address]    RE: Mustapha Carrero       Dear Colleague,     I had the pleasure of seeing Mustapha Carrero in the [facility].        Thank you, Dr. Gibbs, for asking [facility] to evaluate Mr. Mustapha Carrero.      Assessment/Recommendations   Assessment:    Preop cardiac evaluation prior to knee replacement-low risk of cardiac complications of surgery  Coronary artery disease status post coronary artery bypass graft surgery in 2021, no angina  Hypertension, good control  Hypercholesterolemia on statin    Plan:  He appears to be well compensated from cardiac standpoint.  He has no cardiac symptoms and he had recent complete  surgical coronary revascularization.  He is appropriate candidate for surgery.  No special cardiac precautions need to be undertaken.       History of Present Illness    Mr. Mustapha Carrero is a 67 year old male who comes in for preop cardiac evaluation.  He has DJD of right knee.  He is scheduled to undergo replacement at [facility] next week.  The surgical team requested cardiology evaluation.  In 2021 he was experience exertional angina.  He had coronary angiogram and then coronary artery bypass graft surgery.  He is uneventful recovery.  He denies any exertional chest pain or shortness of breath.  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  He has not had heart palpitations syncope.  He denies weight gain, PND, orthopnea.  He is compliant with the medications    ECG: Personally reviewed.  April 2022 normal sinus rhythm no ischemic changes  Echocardiogram 23 March 2021:  1. Normal left ventricular size and systolic performance with ejection fraction of 55 to 60%.  2. No significant valvular heart disease.  3. Normal right ventricular size and systolic performance.  4. Normal atrial dimensions.  5. Mild aortic root enlargement.     Coronary angiogram 23 March 2021 (precoronary artery  bypass graft surgery):  1. Left main coronary artery: No significant stenosis.  2. Left anterior descending coronary artery: 50% mid vessel stenosis.  3. Circumflex coronary artery: Small vessel with proximal-mid 50% stenosis.  4. Right coronary artery: Mid 100% stenosis with subsequent 80% stenosis.  Right PDA with 100% stenosis.  Distal PDA fills faintly from collaterals from bridging collaterals.  5. Left ventriculography: Normal left ventricular systolic performance without wall motion abnormality.     Stress echocardiogram 5 March 2021 (precoronary angiogram):  1. Abnormal stress echocardiogram with evidence of post exercise hypokinesis involving the mid anterolateral, apical lateral and apex. All other segments are normal.  2. Normal baseline left ventricular systolic performance with ejection fraction of 55%.  3. The stress electrocardiogram was abnormal with 1 mm of horizontal ST segment depression in leads II, III, aVF, and V3-6, leads, and returning to baseline after 10 minutes.  4. No chest pain symptoms reported during test.  5. Normal exercise capacity for age/gender.     Ambulatory monitor May 2021:  1. No concerning rhythm disturbances.     Bilateral carotid ultrasound 23 March 2021:  1. Mild plaque formation, velocities consistent with less than 50% stenosis in the right internal carotid artery.  2. Mild plaque formation, velocities consistent with less than 50% stenosis in the left internal carotid artery.  3. Flow within the vertebral arteries is antegrade.       Physical Examination Review of Systems   /70 (BP Location: Left arm, Patient Position: Sitting, Cuff Size: Adult Large)   Pulse 76   Resp 18   Wt 125.3 kg (276 lb 4.8 oz)   SpO2 95%   BMI 39.64 kg/m    Body mass index is 39.64 kg/m .  Wt Readings from Last 3 Encounters:   06/20/22 125.3 kg (276 lb 4.8 oz)   04/14/22 130.6 kg (288 lb)   09/29/21 122.4 kg (269 lb 14.4 oz)     General Appearance:   Alert, cooperative, no distress,  appears stated age   Head/ENT: Normocephalic, without obvious abnormality. Membranes moist      EYES:  no scleral icterus, normal conjunctivae   Neck: Supple, symmetrical, trachea midline, no adenopathy, thyroid: not enlarged, symmetric, no carotid bruit or JVD   Chest/Lungs:   Lungs are clear to auscultation, respirations unlabored. No tenderness or deformity    Cardiovascular:   Regular rhythm, S1, S2 normal, no murmur, rub or gallop.   Abdomen:  Soft, non-tender, bowel sounds active all four quadrants,  no masses, no organomegaly   Extremities: no cyanosis or clubbing. No edema   Skin: Skin color, texture, turgor normal, no rashes or lesions.    Psychiatric: Normal affect, calm   Neurologic: Alert and oriented x 3, moving all four extremities.     Enc Vitals  BP: 114/70  Pulse: 76  Resp: 18  SpO2: 95 %  Weight: 125.3 kg (276 lb 4.8 oz)                                          Lab Results    Chemistry/lipid CBC Cardiac Enzymes/BNP/TSH/INR   Recent Labs   Lab Test 09/21/21  1109   CHOL 133   HDL 38*   LDL 75   TRIG 121   CHOLHDLRATIO 3.5     Recent Labs   Lab Test 09/21/21  1109 03/23/21  0752 09/30/20  1000   LDL 75 71 128*     Recent Labs   Lab Test 09/21/21  1109      POTASSIUM 4.9   CHLORIDE 102   CO2 29   *   BUN 15   CR 0.88   GFRESTIMATED 90   CHRISTOPHER 9.7     Recent Labs   Lab Test 09/21/21  1109 03/29/21  0555 03/27/21  0450   CR 0.88 0.94 0.89     Recent Labs   Lab Test 03/24/21  1219 11/18/19  1201   A1C 5.5 5.7*          Recent Labs   Lab Test 03/29/21  0555   WBC 10.7   HGB 10.5*   HCT 32.4*   MCV 84        Recent Labs   Lab Test 03/29/21  0555 03/27/21  0450 03/26/21  0419   HGB 10.5* 9.9* 11.6*    No results for input(s): TROPONINI in the last 10168 hours.  No results for input(s): BNP, NTBNPI, NTBNP in the last 10814 hours.  No results for input(s): TSH in the last 51534 hours.  Recent Labs   Lab Test 03/26/21  0419 03/25/21  1851 03/25/21  1733   INR 1.27* 1.22* 1.38*        Medical  History  Surgical History Family History Social History   Past Medical History:   Diagnosis Date     Dyslipidemia      GERD (gastroesophageal reflux disease)      Heart disease 3/25/2021    Had double bypass     Hypertension      ANNA (obstructive sleep apnea)      Osteoarthritis      Past Surgical History:   Procedure Laterality Date     ARTHROPLASTY KNEE Left      ARTHROSCOPY SHOULDER ROTATOR CUFF REPAIR       CARDIAC SURGERY  3/25/2021     COLONOSCOPY       CV CORONARY ANGIOGRAM N/A 03/23/2021    Procedure: Coronary Angiogram;  Surgeon: Byron Shanks MD;  Location: [facility];  Service: Cardiology     CV LEFT HEART CATHETERIZATION WITH LEFT VENTRICULOGRAM N/A 03/23/2021    Procedure: Left Heart Catheterization with Left Ventriculogram;  Surgeon: Byron Shanks MD;  Location: [facility];  Service: Cardiology     No premature CAD, SCD,cardiomyopathy   Social History     Socioeconomic History     Marital status:      Spouse name: Not on file     Number of children: Not on file     Years of education: Not on file     Highest education level: Not on file   Occupational History     Not on file   Tobacco Use     Smoking status: Never Smoker     Smokeless tobacco: Never Used   Vaping Use     Vaping Use: Never used   Substance and Sexual Activity     Alcohol use: Yes     Comment: Alcoholic Drinks/day: occasional     Drug use: Never     Sexual activity: Not Currently     Partners: Female   Other Topics Concern     Parent/sibling w/ CABG, MI or angioplasty before 65F 55M? No   Social History Narrative     Not on file     Social Determinants of Health     Financial Resource Strain: Not on file   Food Insecurity: Not on file   Transportation Needs: Not on file   Physical Activity: Not on file   Stress: Not on file   Social Connections: Not on file   Intimate Partner Violence: Not on file   Housing Stability: Not on file         Medications  Allergies   Current Outpatient  Medications   Medication Sig Dispense Refill     acetaminophen (TYLENOL) 325 MG tablet [ACETAMINOPHEN (TYLENOL) 325 MG TABLET] Take 2 tablets (650 mg total) by mouth every 4 (four) hours as needed.  0     amLODIPine (NORVASC) 5 MG tablet Take 1 tablet (5 mg) by mouth daily 30 tablet 11     aspirin 81 mg chewable tablet [ASPIRIN 81 MG CHEWABLE TABLET] Chew 2 tablets (162 mg total) daily.  0     lisinopril (ZESTRIL) 30 MG tablet Take 30 mg by mouth       multivitamin (CENTRUM SILVER) tablet Take 1 tablet by mouth daily       omeprazole (PRILOSEC) 20 MG capsule [OMEPRAZOLE (PRILOSEC) 20 MG CAPSULE] Take 20 mg by mouth daily before breakfast.       OTHER MEDICAL SUPPLIES   CPAP supplies, mask and tubing, See Instructions, Instructions: use nightly, Supply, # 1 EA, 0 Refill(s), Type: Maintenance, other reason (Rx), use nightly       rosuvastatin (CRESTOR) 5 MG tablet Take 1 tablet (5 mg) by mouth At Bedtime 90 tablet 1     sildenafiL (VIAGRA) 100 MG tablet [SILDENAFIL (VIAGRA) 100 MG TABLET] Take 100 mg by mouth daily as needed for erectile dysfunction.          Allergies   Allergen Reactions     Ibuprofen      Other reaction(s): GI intolerance     Nsaids GI Disturbance                       Thank you for allowing me to participate in the care of your patient.      Sincerely,     Tristan Lee MD     [facility]  cc:   Theresa Gibbs MD  [address] [place] [address]

## 2022-06-20 NOTE — PATIENT INSTRUCTIONS
Mustapha Carrero,    It was a pleasure to see you today at the [facility].     My recommendations after this visit include:    Go ahead with knee surgery    HILLARY Lee MD, FACC, GAEL

## 2022-10-03 ENCOUNTER — HEALTH MAINTENANCE LETTER (OUTPATIENT)
Age: 67
End: 2022-10-03